# Patient Record
Sex: MALE | Race: WHITE | Employment: FULL TIME | URBAN - NONMETROPOLITAN AREA
[De-identification: names, ages, dates, MRNs, and addresses within clinical notes are randomized per-mention and may not be internally consistent; named-entity substitution may affect disease eponyms.]

---

## 2017-01-30 ENCOUNTER — OFFICE VISIT (OUTPATIENT)
Dept: NEPHROLOGY | Age: 62
End: 2017-01-30

## 2017-01-30 VITALS — DIASTOLIC BLOOD PRESSURE: 84 MMHG | WEIGHT: 169 LBS | SYSTOLIC BLOOD PRESSURE: 136 MMHG | BODY MASS INDEX: 22.92 KG/M2

## 2017-01-30 DIAGNOSIS — N20.0 NEPHROLITHIASIS: Primary | ICD-10-CM

## 2017-01-30 PROCEDURE — G8427 DOCREV CUR MEDS BY ELIG CLIN: HCPCS | Performed by: INTERNAL MEDICINE

## 2017-01-30 PROCEDURE — 3017F COLORECTAL CA SCREEN DOC REV: CPT | Performed by: INTERNAL MEDICINE

## 2017-01-30 PROCEDURE — 1036F TOBACCO NON-USER: CPT | Performed by: INTERNAL MEDICINE

## 2017-01-30 PROCEDURE — 99214 OFFICE O/P EST MOD 30 MIN: CPT | Performed by: INTERNAL MEDICINE

## 2017-01-30 PROCEDURE — G8484 FLU IMMUNIZE NO ADMIN: HCPCS | Performed by: INTERNAL MEDICINE

## 2017-01-30 PROCEDURE — G8420 CALC BMI NORM PARAMETERS: HCPCS | Performed by: INTERNAL MEDICINE

## 2017-08-07 RX ORDER — HYDROCHLOROTHIAZIDE 25 MG/1
25 TABLET ORAL DAILY
Qty: 90 TABLET | Refills: 3 | Status: SHIPPED | OUTPATIENT
Start: 2017-08-07 | End: 2018-07-06 | Stop reason: SDUPTHER

## 2018-01-29 ENCOUNTER — HOSPITAL ENCOUNTER (OUTPATIENT)
Age: 63
Discharge: HOME | End: 2018-01-29
Payer: COMMERCIAL

## 2018-01-29 DIAGNOSIS — N20.0 NEPHROLITHIASIS: ICD-10-CM

## 2018-01-29 LAB
ANION GAP SERPL CALCULATED.3IONS-SCNC: 13 MEQ/L (ref 8–16)
BUN BLDV-MCNC: 16 MG/DL (ref 7–22)
CALCIUM SERPL-MCNC: 9.1 MG/DL (ref 8.5–10.5)
CHLORIDE BLD-SCNC: 100 MEQ/L (ref 98–111)
CO2: 30 MEQ/L (ref 23–33)
CREAT SERPL-MCNC: 1 MG/DL (ref 0.4–1.2)
GFR SERPL CREATININE-BSD FRML MDRD: 76 ML/MIN/1.73M2
GLUCOSE BLD-MCNC: 112 MG/DL (ref 70–108)
POTASSIUM SERPL-SCNC: 3.7 MEQ/L (ref 3.5–5.2)
SODIUM BLD-SCNC: 143 MEQ/L (ref 135–145)

## 2018-01-29 PROCEDURE — 80048 BASIC METABOLIC PNL TOTAL CA: CPT

## 2018-01-29 PROCEDURE — 36415 COLL VENOUS BLD VENIPUNCTURE: CPT

## 2018-02-05 ENCOUNTER — OFFICE VISIT (OUTPATIENT)
Dept: NEPHROLOGY | Age: 63
End: 2018-02-05
Payer: COMMERCIAL

## 2018-02-05 VITALS
BODY MASS INDEX: 25.63 KG/M2 | HEART RATE: 68 BPM | DIASTOLIC BLOOD PRESSURE: 80 MMHG | RESPIRATION RATE: 18 BRPM | WEIGHT: 189 LBS | SYSTOLIC BLOOD PRESSURE: 138 MMHG

## 2018-02-05 DIAGNOSIS — R82.994 HYPERCALCIURIA: ICD-10-CM

## 2018-02-05 DIAGNOSIS — N20.0 NEPHROLITHIASIS: Primary | ICD-10-CM

## 2018-02-05 PROCEDURE — G8419 CALC BMI OUT NRM PARAM NOF/U: HCPCS | Performed by: INTERNAL MEDICINE

## 2018-02-05 PROCEDURE — 99214 OFFICE O/P EST MOD 30 MIN: CPT | Performed by: INTERNAL MEDICINE

## 2018-02-05 PROCEDURE — G8427 DOCREV CUR MEDS BY ELIG CLIN: HCPCS | Performed by: INTERNAL MEDICINE

## 2018-02-05 PROCEDURE — 1036F TOBACCO NON-USER: CPT | Performed by: INTERNAL MEDICINE

## 2018-02-05 PROCEDURE — G8484 FLU IMMUNIZE NO ADMIN: HCPCS | Performed by: INTERNAL MEDICINE

## 2018-02-05 PROCEDURE — 3017F COLORECTAL CA SCREEN DOC REV: CPT | Performed by: INTERNAL MEDICINE

## 2018-02-05 NOTE — PROGRESS NOTES
Renal Progress Note    Assessment and Plan:     1. Nephrolithiasis     2. Hypercalciuria       PLAN  Labs reviewed with the patient and he understood  We went through the lab report together in EPIC. Vitamin D level is stable at 45 and normal  Little link studies revealed average 24 hour urine output of 1.5 L. Everything else remains mostly unchanged. Again I encouraged him to increase his fluid intake significantly to between 2- and 21/2 L. Medications reviewed. No changes. Continue vitamin D3 over-the-counter 1000 units a day. Return visit in 12 months. Patient Active Problem List   Diagnosis    Nephrolithiasis    Urolithiasis    Hypercalciuria       Subjective:   Chief complaint:  Chief Complaint   Patient presents with    1 Year Follow Up     Nephrolithiasis      HPI:This is a follow up visit for Mr. Kenji Pacheco is [facility] who is here today for a return appointment. I see him for nephrolithiasis. He was last seen about 12 months ago. He has been doing well since then. However he does however have some watery eyes and etiology has not been determined but allergies suspected. No new medications since last time I saw him. No kidney stones attack.     ROS:Constitutional: negative  Eyes: negative  Ears, nose, mouth, throat, and face: negative  Respiratory: negative  Cardiovascular: negative  Gastrointestinal: negative  Genitourinary:negative  Integument/breast: negative  Hematologic/lymphatic: negative  Musculoskeletal:positive for arthralgias and stiff joints  Neurological: negative  Behavioral/Psych: negative  Endocrine: negative  Allergic/Immunologic: negative  Medications:     Current Outpatient Prescriptions   Medication Sig Dispense Refill    hydrochlorothiazide (HYDRODIURIL) 25 MG tablet Take 1 tablet by mouth daily 90 tablet 3    potassium citrate (UROCIT-K) 10 MEQ (1080 MG) extended release tablet Take 1 tablet by mouth 3  times daily with meals 300 tablet 5    Vitamin D (CHOLECALCIFEROL) 1000 138/80   Pulse 68   Resp 18   Wt 189 lb (85.7 kg)   BMI 25.63 kg/m²      Constitutional:  Alert, awake, no apparent distress  Skin:normal  HEENT:Pupils are reactive . Throat is clear  Neck:supple with no thyromegally  Cardiovascular:  S1, S2 without m/r/g  Respiratory:  CTA B without w/r/r  Abdomen: +bs, soft, nt  Ext: No LE edema  Musculoskeletal:Intact  Neuro:Alert and oriented with no deficit    Electronically signed by Joan Oliver MD on 2/5/2018 at 8:08 AM

## 2018-03-29 ENCOUNTER — HOSPITAL ENCOUNTER (EMERGENCY)
Age: 63
Discharge: HOME | End: 2018-03-29
Payer: COMMERCIAL

## 2018-03-29 VITALS
HEIGHT: 72 IN | SYSTOLIC BLOOD PRESSURE: 143 MMHG | TEMPERATURE: 98.7 F | DIASTOLIC BLOOD PRESSURE: 87 MMHG | OXYGEN SATURATION: 98 % | WEIGHT: 180 LBS | RESPIRATION RATE: 12 BRPM | HEART RATE: 91 BPM | BODY MASS INDEX: 24.38 KG/M2

## 2018-03-29 DIAGNOSIS — J02.0 STREP PHARYNGITIS: Primary | ICD-10-CM

## 2018-03-29 LAB
GROUP A STREP CULTURE, REFLEX: POSITIVE
REFLEX THROAT C + S: NORMAL

## 2018-03-29 PROCEDURE — 99213 OFFICE O/P EST LOW 20 MIN: CPT | Performed by: NURSE PRACTITIONER

## 2018-03-29 PROCEDURE — 99213 OFFICE O/P EST LOW 20 MIN: CPT

## 2018-03-29 RX ORDER — AMOXICILLIN 500 MG/1
500 CAPSULE ORAL 2 TIMES DAILY
Qty: 20 CAPSULE | Refills: 0 | Status: SHIPPED | OUTPATIENT
Start: 2018-03-29 | End: 2018-04-08

## 2018-03-29 ASSESSMENT — ENCOUNTER SYMPTOMS
SORE THROAT: 1
SINUS PAIN: 0
RHINORRHEA: 0
NAUSEA: 0
VOMITING: 0
COUGH: 0
DIARRHEA: 0
SHORTNESS OF BREATH: 0
SINUS PRESSURE: 0

## 2018-03-29 ASSESSMENT — PAIN DESCRIPTION - LOCATION: LOCATION: THROAT

## 2018-03-29 ASSESSMENT — PAIN DESCRIPTION - PAIN TYPE: TYPE: ACUTE PAIN

## 2018-03-29 ASSESSMENT — PAIN SCALES - GENERAL: PAINLEVEL_OUTOF10: 5

## 2018-03-29 ASSESSMENT — PAIN DESCRIPTION - DESCRIPTORS: DESCRIPTORS: ACHING

## 2018-03-29 ASSESSMENT — PAIN DESCRIPTION - ONSET: ONSET: GRADUAL

## 2018-03-29 ASSESSMENT — PAIN DESCRIPTION - FREQUENCY: FREQUENCY: CONTINUOUS

## 2018-03-29 ASSESSMENT — PAIN DESCRIPTION - PROGRESSION: CLINICAL_PROGRESSION: NOT CHANGED

## 2018-03-29 NOTE — ED PROVIDER NOTES
[address]  Urgent Care Encounter       CHIEF COMPLAINT       Chief Complaint   Patient presents with    Pharyngitis    Muscle Pain       Nurses Notes reviewed and I agree except as noted in the HPI. HISTORY OF PRESENT ILLNESS   Letitia Najjar is a 66 y.o. male who presents With three-day history of sore throat and body aches. Also reports occasional chills but no fever. He does have some tenderness in his neck. Appetite is been decreased but has been drinking. It's able to swallow liquids easily but solid food creates a fair amount of pain. He denies any ear pain and cough. He has mild sinus congestion. No nausea, vomiting, or diarrhea. He did mention that he was around his daughter approximately 2 weeks ago who he found out later was diagnosed with strep throat. He is not sure if that timeframe coordinates with his symptoms. The history is provided by the patient. No  was used. REVIEW OF SYSTEMS     Review of Systems   Constitutional: Positive for appetite change and chills (Occasional). Negative for fatigue and fever. HENT: Positive for congestion (Mild) and sore throat. Negative for rhinorrhea, sinus pain and sinus pressure. Respiratory: Negative for cough and shortness of breath. Cardiovascular: Negative for chest pain. Gastrointestinal: Negative for diarrhea, nausea and vomiting. Musculoskeletal: Positive for myalgias. Neurological: Negative for dizziness and headaches. PAST MEDICAL HISTORY         Diagnosis Date    Kidney stones        SURGICAL HISTORY     Patient  has a past surgical history that includes hernia repair (1967?); Cystocopy (12/11/2012); and knee surgery (Right, March 2013).     CURRENT MEDICATIONS       Discharge Medication List as of 3/29/2018  2:56 PM      CONTINUE these medications which have NOT CHANGED    Details   hydrochlorothiazide (HYDRODIURIL) 25 MG tablet Take 1 tablet by mouth daily, Disp-90 tablet, Head (left side): No submental, no submandibular, no tonsillar, no preauricular, no posterior auricular and no occipital adenopathy present. He has cervical adenopathy. Right cervical: Posterior cervical adenopathy present. Left cervical: Posterior cervical adenopathy present. Neurological: He is alert and oriented to person, place, and time. Skin: Skin is warm and dry. Psychiatric: He has a normal mood and affect. His speech is normal and behavior is normal. Judgment and thought content normal.   Nursing note and vitals reviewed. DIAGNOSTIC RESULTS   Labs:  Results for orders placed or performed during the hospital encounter of 03/29/18   STREP A ANTIGEN   Result Value Ref Range    GROUP A STREP CULTURE, REFLEX POSITIVE (A)        IMAGING:    No orders to display           URGENT CARE COURSE:     Vitals:    03/29/18 1416   BP: (!) 143/87   Pulse: 91   Resp: 12   Temp: 98.7 °F (37.1 °C)   TempSrc: Oral   SpO2: 98%   Weight: 180 lb (81.6 kg)   Height: 6' (1.829 m)       Medications - No data to display    ED Course        PROCEDURES:  None    FINAL IMPRESSION      1. Strep pharyngitis          DISPOSITION/PLAN   DISPOSITION Decision To Discharge 03/29/2018 02:54:13 PM  Plenty of fluids orally. Salt water gargles as needed for sore throat or OTC throat spray/lozenges. Cool mist humidifier at bedside for congestion. Saline rinses as needed for nasal congestion. May take Tylenol and/or Ibuprofen for fever or body aches as directed. May take OTC antihistamines/ decongestant as needed. Follow up with family doctor as needed. Pt to go to ER if symptoms worsen, new symptoms develop, high fever >102, vomiting, breathing difficulty, lethargy. Take antibiotic as prescribed for the full 10 days. Patient's positive for strep pharyngitis. Treated with amoxicillin 500 mg twice a day ×10 days. Other instructions as indicated above. All the patient's questions answered.  The patient was discharged from the  center in good condition.     PATIENT REFERRED TO:  Adry Aj MD  [address] [place] [address]  [phone]    In 1 week  As needed, If symptoms worsen      DISCHARGE MEDICATIONS:  Discharge Medication List as of 3/29/2018  2:56 PM      START taking these medications    Details   amoxicillin (AMOXIL) 500 MG capsule Take 1 capsule by mouth 2 times daily for 10 days, Disp-20 capsule, R-0Normal             Discharge Medication List as of 3/29/2018  2:56 PM          Discharge Medication List as of 3/29/2018  2:56 PM          Dian Gu CNP    (Please note that portions of this note were completed with a voice recognition program.  Efforts were made to edit the dictations but occasionally words are mis-transcribed.)         Dian Gu CNP  03/29/18 1500

## 2018-07-06 DIAGNOSIS — R82.994 HYPERCALCIURIA: ICD-10-CM

## 2018-07-06 DIAGNOSIS — N20.0 NEPHROLITHIASIS: ICD-10-CM

## 2018-07-06 RX ORDER — POTASSIUM CITRATE 10 MEQ/1
TABLET, EXTENDED RELEASE ORAL
Qty: 270 TABLET | Refills: 3 | Status: SHIPPED | OUTPATIENT
Start: 2018-07-06 | End: 2018-12-18 | Stop reason: SDUPTHER

## 2018-07-06 RX ORDER — HYDROCHLOROTHIAZIDE 25 MG/1
25 TABLET ORAL DAILY
Qty: 90 TABLET | Refills: 3 | Status: SHIPPED | OUTPATIENT
Start: 2018-07-06 | End: 2019-08-14 | Stop reason: SDUPTHER

## 2018-12-18 DIAGNOSIS — R82.994 HYPERCALCIURIA: ICD-10-CM

## 2018-12-18 DIAGNOSIS — N20.0 NEPHROLITHIASIS: ICD-10-CM

## 2018-12-19 RX ORDER — POTASSIUM CITRATE 10 MEQ/1
TABLET, EXTENDED RELEASE ORAL
Qty: 270 TABLET | Refills: 2 | Status: SHIPPED | OUTPATIENT
Start: 2018-12-19 | End: 2019-08-17 | Stop reason: SDUPTHER

## 2019-01-29 DIAGNOSIS — N20.0 NEPHROLITHIASIS: Primary | ICD-10-CM

## 2019-02-03 ENCOUNTER — HOSPITAL ENCOUNTER (OUTPATIENT)
Age: 64
Discharge: HOME | End: 2019-02-03
Payer: COMMERCIAL

## 2019-02-03 DIAGNOSIS — N20.0 NEPHROLITHIASIS: ICD-10-CM

## 2019-02-03 LAB
ANION GAP SERPL CALCULATED.3IONS-SCNC: 10 MEQ/L (ref 8–16)
BUN BLDV-MCNC: 14 MG/DL (ref 7–22)
CALCIUM SERPL-MCNC: 9.2 MG/DL (ref 8.5–10.5)
CHLORIDE BLD-SCNC: 99 MEQ/L (ref 98–111)
CO2: 30 MEQ/L (ref 23–33)
CREAT SERPL-MCNC: 1 MG/DL (ref 0.4–1.2)
GFR SERPL CREATININE-BSD FRML MDRD: 76 ML/MIN/1.73M2
GLUCOSE BLD-MCNC: 99 MG/DL (ref 70–108)
POTASSIUM SERPL-SCNC: 4.2 MEQ/L (ref 3.5–5.2)
SODIUM BLD-SCNC: 139 MEQ/L (ref 135–145)

## 2019-02-03 PROCEDURE — 80048 BASIC METABOLIC PNL TOTAL CA: CPT

## 2019-02-03 PROCEDURE — 36415 COLL VENOUS BLD VENIPUNCTURE: CPT

## 2019-02-04 ENCOUNTER — OFFICE VISIT (OUTPATIENT)
Dept: NEPHROLOGY | Age: 64
End: 2019-02-04
Payer: COMMERCIAL

## 2019-02-04 VITALS
DIASTOLIC BLOOD PRESSURE: 80 MMHG | WEIGHT: 193.8 LBS | BODY MASS INDEX: 26.28 KG/M2 | SYSTOLIC BLOOD PRESSURE: 131 MMHG | OXYGEN SATURATION: 98 % | HEART RATE: 71 BPM

## 2019-02-04 DIAGNOSIS — R82.994 HYPERCALCIURIA: ICD-10-CM

## 2019-02-04 DIAGNOSIS — N20.0 NEPHROLITHIASIS: Primary | ICD-10-CM

## 2019-02-04 PROCEDURE — 3017F COLORECTAL CA SCREEN DOC REV: CPT | Performed by: INTERNAL MEDICINE

## 2019-02-04 PROCEDURE — 1036F TOBACCO NON-USER: CPT | Performed by: INTERNAL MEDICINE

## 2019-02-04 PROCEDURE — 99213 OFFICE O/P EST LOW 20 MIN: CPT | Performed by: INTERNAL MEDICINE

## 2019-02-04 PROCEDURE — G8419 CALC BMI OUT NRM PARAM NOF/U: HCPCS | Performed by: INTERNAL MEDICINE

## 2019-02-04 PROCEDURE — G8427 DOCREV CUR MEDS BY ELIG CLIN: HCPCS | Performed by: INTERNAL MEDICINE

## 2019-02-04 PROCEDURE — G8484 FLU IMMUNIZE NO ADMIN: HCPCS | Performed by: INTERNAL MEDICINE

## 2019-02-04 RX ORDER — FLUTICASONE PROPIONATE 50 MCG
SPRAY, SUSPENSION (ML) NASAL
COMMUNITY
Start: 2019-01-29 | End: 2019-07-15

## 2019-02-04 RX ORDER — TRIAMCINOLONE ACETONIDE 5 MG/G
CREAM TOPICAL
COMMUNITY
Start: 2019-01-29 | End: 2019-07-15

## 2019-07-15 ENCOUNTER — OFFICE VISIT (OUTPATIENT)
Dept: FAMILY MEDICINE CLINIC | Age: 64
End: 2019-07-15
Payer: COMMERCIAL

## 2019-07-15 VITALS
DIASTOLIC BLOOD PRESSURE: 78 MMHG | BODY MASS INDEX: 25.33 KG/M2 | WEIGHT: 187 LBS | SYSTOLIC BLOOD PRESSURE: 130 MMHG | HEART RATE: 64 BPM | HEIGHT: 72 IN

## 2019-07-15 DIAGNOSIS — R10.32 LEFT GROIN PAIN: Primary | ICD-10-CM

## 2019-07-15 DIAGNOSIS — S76.212A GROIN STRAIN, LEFT, INITIAL ENCOUNTER: ICD-10-CM

## 2019-07-15 PROCEDURE — G8427 DOCREV CUR MEDS BY ELIG CLIN: HCPCS | Performed by: FAMILY MEDICINE

## 2019-07-15 PROCEDURE — 3017F COLORECTAL CA SCREEN DOC REV: CPT | Performed by: FAMILY MEDICINE

## 2019-07-15 PROCEDURE — 99213 OFFICE O/P EST LOW 20 MIN: CPT | Performed by: FAMILY MEDICINE

## 2019-07-15 PROCEDURE — G8419 CALC BMI OUT NRM PARAM NOF/U: HCPCS | Performed by: FAMILY MEDICINE

## 2019-07-15 PROCEDURE — 1036F TOBACCO NON-USER: CPT | Performed by: FAMILY MEDICINE

## 2019-07-15 ASSESSMENT — PATIENT HEALTH QUESTIONNAIRE - PHQ9
SUM OF ALL RESPONSES TO PHQ9 QUESTIONS 1 & 2: 0
2. FEELING DOWN, DEPRESSED OR HOPELESS: 0
SUM OF ALL RESPONSES TO PHQ QUESTIONS 1-9: 0
1. LITTLE INTEREST OR PLEASURE IN DOING THINGS: 0
SUM OF ALL RESPONSES TO PHQ QUESTIONS 1-9: 0

## 2019-07-15 ASSESSMENT — ENCOUNTER SYMPTOMS: ABDOMINAL PAIN: 0

## 2019-08-17 DIAGNOSIS — N20.0 NEPHROLITHIASIS: ICD-10-CM

## 2019-08-17 DIAGNOSIS — R82.994 HYPERCALCIURIA: ICD-10-CM

## 2019-08-19 RX ORDER — POTASSIUM CITRATE 10 MEQ/1
TABLET, EXTENDED RELEASE ORAL
Qty: 270 TABLET | Refills: 1 | Status: SHIPPED | OUTPATIENT
Start: 2019-08-19 | End: 2020-01-09

## 2019-08-21 RX ORDER — HYDROCHLOROTHIAZIDE 25 MG/1
25 TABLET ORAL DAILY
Qty: 90 TABLET | Refills: 3 | Status: SHIPPED | OUTPATIENT
Start: 2019-08-21 | End: 2020-06-29

## 2019-08-22 ENCOUNTER — HOSPITAL ENCOUNTER (EMERGENCY)
Age: 64
Discharge: HOME | End: 2019-08-22
Attending: NURSE PRACTITIONER
Payer: COMMERCIAL

## 2019-08-22 VITALS
OXYGEN SATURATION: 98 % | TEMPERATURE: 98 F | RESPIRATION RATE: 18 BRPM | HEART RATE: 73 BPM | HEIGHT: 72 IN | DIASTOLIC BLOOD PRESSURE: 86 MMHG | WEIGHT: 190 LBS | BODY MASS INDEX: 25.73 KG/M2 | SYSTOLIC BLOOD PRESSURE: 139 MMHG

## 2019-08-22 DIAGNOSIS — J02.9 ACUTE PHARYNGITIS, UNSPECIFIED ETIOLOGY: Primary | ICD-10-CM

## 2019-08-22 PROCEDURE — 99213 OFFICE O/P EST LOW 20 MIN: CPT | Performed by: NURSE PRACTITIONER

## 2019-08-22 PROCEDURE — 99212 OFFICE O/P EST SF 10 MIN: CPT

## 2019-08-22 RX ORDER — AMOXICILLIN 875 MG/1
875 TABLET, COATED ORAL 2 TIMES DAILY
Qty: 20 TABLET | Refills: 0 | Status: SHIPPED | OUTPATIENT
Start: 2019-08-22 | End: 2019-09-01

## 2019-08-22 ASSESSMENT — ENCOUNTER SYMPTOMS
SINUS PAIN: 0
SINUS PRESSURE: 0
TROUBLE SWALLOWING: 1
NAUSEA: 0
VOMITING: 0
SORE THROAT: 1
DIARRHEA: 0
SHORTNESS OF BREATH: 0
ABDOMINAL PAIN: 0

## 2019-08-22 ASSESSMENT — PAIN DESCRIPTION - PAIN TYPE: TYPE: ACUTE PAIN

## 2019-08-22 ASSESSMENT — PAIN DESCRIPTION - ONSET: ONSET: ON-GOING

## 2019-08-22 ASSESSMENT — PAIN - FUNCTIONAL ASSESSMENT: PAIN_FUNCTIONAL_ASSESSMENT: ACTIVITIES ARE NOT PREVENTED

## 2019-08-22 ASSESSMENT — PAIN DESCRIPTION - FREQUENCY: FREQUENCY: CONTINUOUS

## 2019-08-22 ASSESSMENT — PAIN DESCRIPTION - LOCATION: LOCATION: THROAT

## 2019-08-22 ASSESSMENT — PAIN DESCRIPTION - PROGRESSION: CLINICAL_PROGRESSION: NOT CHANGED

## 2019-08-22 ASSESSMENT — PAIN SCALES - GENERAL: PAINLEVEL_OUTOF10: 3

## 2019-08-22 ASSESSMENT — PAIN DESCRIPTION - DESCRIPTORS: DESCRIPTORS: DISCOMFORT

## 2019-09-14 ENCOUNTER — HOSPITAL ENCOUNTER (EMERGENCY)
Age: 64
Discharge: HOME | End: 2019-09-14
Payer: COMMERCIAL

## 2019-09-14 VITALS
HEIGHT: 72 IN | HEART RATE: 66 BPM | WEIGHT: 185 LBS | DIASTOLIC BLOOD PRESSURE: 74 MMHG | OXYGEN SATURATION: 96 % | TEMPERATURE: 98.1 F | RESPIRATION RATE: 16 BRPM | BODY MASS INDEX: 25.06 KG/M2 | SYSTOLIC BLOOD PRESSURE: 111 MMHG

## 2019-09-14 DIAGNOSIS — J40 BRONCHITIS: Primary | ICD-10-CM

## 2019-09-14 DIAGNOSIS — J02.9 ACUTE PHARYNGITIS, UNSPECIFIED ETIOLOGY: ICD-10-CM

## 2019-09-14 PROCEDURE — 99212 OFFICE O/P EST SF 10 MIN: CPT

## 2019-09-14 PROCEDURE — 99214 OFFICE O/P EST MOD 30 MIN: CPT | Performed by: NURSE PRACTITIONER

## 2019-09-14 RX ORDER — PREDNISONE 20 MG/1
40 TABLET ORAL DAILY
Qty: 10 TABLET | Refills: 0 | Status: SHIPPED | OUTPATIENT
Start: 2019-09-14 | End: 2019-09-19

## 2019-09-14 RX ORDER — AMOXICILLIN AND CLAVULANATE POTASSIUM 875; 125 MG/1; MG/1
1 TABLET, FILM COATED ORAL 2 TIMES DAILY
Qty: 20 TABLET | Refills: 0 | Status: SHIPPED | OUTPATIENT
Start: 2019-09-14 | End: 2019-09-24

## 2019-09-14 ASSESSMENT — PAIN DESCRIPTION - PROGRESSION: CLINICAL_PROGRESSION: NOT CHANGED

## 2019-09-14 ASSESSMENT — PAIN DESCRIPTION - LOCATION: LOCATION: CHEST

## 2019-09-14 ASSESSMENT — PAIN DESCRIPTION - ONSET: ONSET: ON-GOING

## 2019-09-14 ASSESSMENT — PAIN DESCRIPTION - PAIN TYPE: TYPE: ACUTE PAIN

## 2019-09-14 ASSESSMENT — PAIN DESCRIPTION - DESCRIPTORS: DESCRIPTORS: PRESSURE;TIGHTNESS

## 2019-09-14 ASSESSMENT — PAIN SCALES - GENERAL: PAINLEVEL_OUTOF10: 5

## 2019-09-14 ASSESSMENT — PAIN DESCRIPTION - ORIENTATION: ORIENTATION: MID

## 2019-09-14 ASSESSMENT — PAIN DESCRIPTION - FREQUENCY: FREQUENCY: CONTINUOUS

## 2019-09-14 ASSESSMENT — PAIN - FUNCTIONAL ASSESSMENT: PAIN_FUNCTIONAL_ASSESSMENT: ACTIVITIES ARE NOT PREVENTED

## 2019-09-14 NOTE — ED PROVIDER NOTES
[address]  Urgent Care Encounter       CHIEF COMPLAINT       Chief Complaint   Patient presents with    Cough    Pharyngitis       Nurses Notes reviewed and I agree except as noted in the HPI. HISTORY OF PRESENT ILLNESS   Shmuel Alcocer is a 60 y.o. male who presents with complaints of sore throat, chest tightness and cough for the past 10 days. Symptoms have become progressively worse since onset. Patient denies fever, chills, nausea, vomiting, otalgia, shortness of breath and fatigue. No reports of wheezing. He did have a similar illness approximately 1/2 months ago which was treated successfully with antibiotics. The history is provided by the patient. REVIEW OF SYSTEMS     Review of Systems   Constitutional: Negative for appetite change, chills, fatigue and fever. HENT: Positive for congestion, rhinorrhea and sore throat. Negative for sinus pressure. Eyes: Negative for visual disturbance. Respiratory: Positive for cough. Negative for shortness of breath and wheezing. Cardiovascular: Negative for chest pain. Gastrointestinal: Negative for diarrhea, nausea and vomiting. Skin: Negative for rash. Allergic/Immunologic: Negative for environmental allergies. Neurological: Negative for headaches. PAST MEDICAL HISTORY         Diagnosis Date    Kidney stones        SURGICALHISTORY     Patient  has a past surgical history that includes hernia repair (1967?); Cystocopy (12/11/2012); knee surgery (Right, March 2013); and knee surgery (Left).     CURRENT MEDICATIONS       Discharge Medication List as of 9/14/2019 10:44 AM      CONTINUE these medications which have NOT CHANGED    Details   hydrochlorothiazide (HYDRODIURIL) 25 MG tablet TAKE 1 TABLET BY MOUTH  DAILY, Disp-90 tablet, R-3Normal      potassium citrate (UROCIT-K) 10 MEQ (1080 MG) extended release tablet TAKE 1 TABLET BY MOUTH 3  TIMES DAILY WITH MEALS, Disp-270 tablet, R-1Normal      Vitamin D

## 2019-09-16 ASSESSMENT — ENCOUNTER SYMPTOMS
COUGH: 1
SORE THROAT: 1
SINUS PRESSURE: 0
VOMITING: 0
DIARRHEA: 0
SHORTNESS OF BREATH: 0
RHINORRHEA: 1
NAUSEA: 0
WHEEZING: 0

## 2019-12-13 ENCOUNTER — OFFICE VISIT (OUTPATIENT)
Dept: FAMILY MEDICINE CLINIC | Age: 64
End: 2019-12-13
Payer: COMMERCIAL

## 2019-12-13 VITALS
SYSTOLIC BLOOD PRESSURE: 124 MMHG | DIASTOLIC BLOOD PRESSURE: 80 MMHG | HEIGHT: 72 IN | BODY MASS INDEX: 25.47 KG/M2 | WEIGHT: 188 LBS | RESPIRATION RATE: 14 BRPM | HEART RATE: 76 BPM

## 2019-12-13 DIAGNOSIS — R10.32 LEFT GROIN PAIN: Primary | ICD-10-CM

## 2019-12-13 PROCEDURE — 99213 OFFICE O/P EST LOW 20 MIN: CPT | Performed by: FAMILY MEDICINE

## 2019-12-13 PROCEDURE — 1036F TOBACCO NON-USER: CPT | Performed by: FAMILY MEDICINE

## 2019-12-13 PROCEDURE — G8427 DOCREV CUR MEDS BY ELIG CLIN: HCPCS | Performed by: FAMILY MEDICINE

## 2019-12-13 PROCEDURE — 3017F COLORECTAL CA SCREEN DOC REV: CPT | Performed by: FAMILY MEDICINE

## 2019-12-13 PROCEDURE — G8419 CALC BMI OUT NRM PARAM NOF/U: HCPCS | Performed by: FAMILY MEDICINE

## 2019-12-13 PROCEDURE — G8484 FLU IMMUNIZE NO ADMIN: HCPCS | Performed by: FAMILY MEDICINE

## 2020-01-02 ENCOUNTER — HOSPITAL ENCOUNTER (OUTPATIENT)
Dept: GENERAL RADIOLOGY | Age: 65
Discharge: HOME | End: 2020-01-02
Payer: COMMERCIAL

## 2020-01-02 ENCOUNTER — HOSPITAL ENCOUNTER (OUTPATIENT)
Age: 65
Discharge: HOME | End: 2020-01-02
Payer: COMMERCIAL

## 2020-01-02 PROCEDURE — 73502 X-RAY EXAM HIP UNI 2-3 VIEWS: CPT

## 2020-01-03 ENCOUNTER — TELEPHONE (OUTPATIENT)
Dept: FAMILY MEDICINE CLINIC | Age: 65
End: 2020-01-03

## 2020-01-03 PROBLEM — M16.12 ARTHRITIS OF LEFT HIP: Status: ACTIVE | Noted: 2020-01-03

## 2020-01-09 RX ORDER — POTASSIUM CITRATE 10 MEQ/1
TABLET, EXTENDED RELEASE ORAL
Qty: 270 TABLET | Refills: 1 | Status: SHIPPED | OUTPATIENT
Start: 2020-01-09 | End: 2020-03-19

## 2020-01-27 ENCOUNTER — HOSPITAL ENCOUNTER (OUTPATIENT)
Age: 65
Discharge: HOME | End: 2020-01-27
Payer: COMMERCIAL

## 2020-01-27 DIAGNOSIS — N20.0 NEPHROLITHIASIS: ICD-10-CM

## 2020-01-27 LAB
ANION GAP SERPL CALCULATED.3IONS-SCNC: 12 MEQ/L (ref 8–16)
BUN BLDV-MCNC: 19 MG/DL (ref 7–22)
CALCIUM SERPL-MCNC: 9.3 MG/DL (ref 8.5–10.5)
CHLORIDE BLD-SCNC: 99 MEQ/L (ref 98–111)
CO2: 30 MEQ/L (ref 23–33)
CREAT SERPL-MCNC: 1 MG/DL (ref 0.4–1.2)
GFR SERPL CREATININE-BSD FRML MDRD: 75 ML/MIN/1.73M2
GLUCOSE BLD-MCNC: 90 MG/DL (ref 70–108)
POTASSIUM SERPL-SCNC: 3.5 MEQ/L (ref 3.5–5.2)
SODIUM BLD-SCNC: 141 MEQ/L (ref 135–145)

## 2020-01-27 PROCEDURE — 36415 COLL VENOUS BLD VENIPUNCTURE: CPT

## 2020-01-27 PROCEDURE — 80048 BASIC METABOLIC PNL TOTAL CA: CPT

## 2020-02-03 ENCOUNTER — OFFICE VISIT (OUTPATIENT)
Dept: NEPHROLOGY | Age: 65
End: 2020-02-03
Payer: COMMERCIAL

## 2020-02-03 VITALS
OXYGEN SATURATION: 97 % | DIASTOLIC BLOOD PRESSURE: 78 MMHG | WEIGHT: 194 LBS | SYSTOLIC BLOOD PRESSURE: 128 MMHG | HEART RATE: 59 BPM | BODY MASS INDEX: 26.31 KG/M2

## 2020-02-03 PROCEDURE — 3017F COLORECTAL CA SCREEN DOC REV: CPT | Performed by: INTERNAL MEDICINE

## 2020-02-03 PROCEDURE — G8427 DOCREV CUR MEDS BY ELIG CLIN: HCPCS | Performed by: INTERNAL MEDICINE

## 2020-02-03 PROCEDURE — 1036F TOBACCO NON-USER: CPT | Performed by: INTERNAL MEDICINE

## 2020-02-03 PROCEDURE — G8419 CALC BMI OUT NRM PARAM NOF/U: HCPCS | Performed by: INTERNAL MEDICINE

## 2020-02-03 PROCEDURE — G8484 FLU IMMUNIZE NO ADMIN: HCPCS | Performed by: INTERNAL MEDICINE

## 2020-02-03 PROCEDURE — 99213 OFFICE O/P EST LOW 20 MIN: CPT | Performed by: INTERNAL MEDICINE

## 2020-02-03 NOTE — PROGRESS NOTES
with no wheezes or rales  Abdomen: +bowel sound, soft, non tender and no bruit  Ext: No LE edema  Musculoskeletal:Intact  Neuro:Alert, awake and oriented with no obvious focal deficit.   Speech is normal.    Electronically signed by Silvia Roberson MD on 2/3/2020 at 8:00 AM

## 2020-03-19 RX ORDER — POTASSIUM CITRATE 10 MEQ/1
TABLET, EXTENDED RELEASE ORAL
Qty: 270 TABLET | Refills: 3 | Status: SHIPPED | OUTPATIENT
Start: 2020-03-19 | End: 2021-01-27

## 2020-06-29 RX ORDER — HYDROCHLOROTHIAZIDE 25 MG/1
25 TABLET ORAL DAILY
Qty: 90 TABLET | Refills: 3 | Status: SHIPPED | OUTPATIENT
Start: 2020-06-29 | End: 2021-05-21

## 2021-01-26 DIAGNOSIS — N20.0 NEPHROLITHIASIS: ICD-10-CM

## 2021-01-26 DIAGNOSIS — R82.994 HYPERCALCIURIA: ICD-10-CM

## 2021-01-27 RX ORDER — POTASSIUM CITRATE 10 MEQ/1
TABLET, EXTENDED RELEASE ORAL
Qty: 270 TABLET | Refills: 3 | Status: SHIPPED | OUTPATIENT
Start: 2021-01-27 | End: 2021-06-25

## 2021-02-10 DIAGNOSIS — N20.0 NEPHROLITHIASIS: Primary | ICD-10-CM

## 2021-02-11 ENCOUNTER — HOSPITAL ENCOUNTER (OUTPATIENT)
Age: 66
Discharge: HOME | End: 2021-02-11
Payer: COMMERCIAL

## 2021-02-11 DIAGNOSIS — N20.0 NEPHROLITHIASIS: ICD-10-CM

## 2021-02-11 PROCEDURE — 80048 BASIC METABOLIC PNL TOTAL CA: CPT

## 2021-02-11 PROCEDURE — 36415 COLL VENOUS BLD VENIPUNCTURE: CPT

## 2021-02-12 LAB
ANION GAP SERPL CALCULATED.3IONS-SCNC: 8 MEQ/L (ref 8–16)
BUN BLDV-MCNC: 14 MG/DL (ref 7–22)
CALCIUM SERPL-MCNC: 9.3 MG/DL (ref 8.5–10.5)
CHLORIDE BLD-SCNC: 99 MEQ/L (ref 98–111)
CO2: 32 MEQ/L (ref 23–33)
CREAT SERPL-MCNC: 0.9 MG/DL (ref 0.4–1.2)
GFR SERPL CREATININE-BSD FRML MDRD: 85 ML/MIN/1.73M2
GLUCOSE BLD-MCNC: 85 MG/DL (ref 70–108)
POTASSIUM SERPL-SCNC: 4 MEQ/L (ref 3.5–5.2)
SODIUM BLD-SCNC: 139 MEQ/L (ref 135–145)

## 2021-02-15 ENCOUNTER — OFFICE VISIT (OUTPATIENT)
Dept: NEPHROLOGY | Age: 66
End: 2021-02-15
Payer: COMMERCIAL

## 2021-02-15 VITALS
DIASTOLIC BLOOD PRESSURE: 79 MMHG | HEART RATE: 71 BPM | SYSTOLIC BLOOD PRESSURE: 128 MMHG | WEIGHT: 196 LBS | BODY MASS INDEX: 26.58 KG/M2 | OXYGEN SATURATION: 98 % | TEMPERATURE: 98.6 F

## 2021-02-15 DIAGNOSIS — R82.994 HYPERCALCIURIA: ICD-10-CM

## 2021-02-15 DIAGNOSIS — N20.0 NEPHROLITHIASIS: Primary | ICD-10-CM

## 2021-02-15 PROCEDURE — 1036F TOBACCO NON-USER: CPT | Performed by: INTERNAL MEDICINE

## 2021-02-15 PROCEDURE — 3017F COLORECTAL CA SCREEN DOC REV: CPT | Performed by: INTERNAL MEDICINE

## 2021-02-15 PROCEDURE — G8427 DOCREV CUR MEDS BY ELIG CLIN: HCPCS | Performed by: INTERNAL MEDICINE

## 2021-02-15 PROCEDURE — G8484 FLU IMMUNIZE NO ADMIN: HCPCS | Performed by: INTERNAL MEDICINE

## 2021-02-15 PROCEDURE — G8419 CALC BMI OUT NRM PARAM NOF/U: HCPCS | Performed by: INTERNAL MEDICINE

## 2021-02-15 PROCEDURE — 99212 OFFICE O/P EST SF 10 MIN: CPT | Performed by: INTERNAL MEDICINE

## 2021-02-15 NOTE — PROGRESS NOTES
Renal Progress Note    Assessment and Plan:      Diagnosis Orders   1. Nephrolithiasis  Basic Metabolic Panel    LITHOLINK   2. Hypercalciuria       PLAN:   Lab result discussed with the patient. He understood. Kidney function is normal.  Litholink report discussed with him. The parameters are all favorable. Low risk for kidney stone attack based on the report. Discussed with the patient  Return visit in 12 months with BMP and Litholink studies      Patient Active Problem List   Diagnosis    Nephrolithiasis    Urolithiasis    Hypercalciuria    Arthritis of left hip       Subjective:   Chief complaint:  Chief Complaint   Patient presents with    Nephrolithiasis      HPI:This is a follow up visit for Mr. Liz Menendez  who is here today for return appointment. I see him for nephrolithiasis and hypercalciuria. He was last seen about 12 months ago. Doing well since then with no complaint. No new medications since last seen. No chest pain or shortness of breath. No nausea vomiting. No fever chills. No headaches. No kidney stone attack. ROS:  Pertinent positives stated above in HPI. All other systems were reviewed and were negative. Medications:     Current Outpatient Medications   Medication Sig Dispense Refill    potassium citrate (UROCIT-K) 10 MEQ (1080 MG) extended release tablet TAKE 1 TABLET BY MOUTH 3  TIMES DAILY WITH MEALS 270 tablet 3    hydroCHLOROthiazide (HYDRODIURIL) 25 MG tablet TAKE 1 TABLET BY MOUTH  DAILY 90 tablet 3    Vitamin D (CHOLECALCIFEROL) 1000 UNITS CAPS capsule Take 1,000 Units by mouth daily        No current facility-administered medications for this visit.         Lab Results:    CBC:   Lab Results   Component Value Date    WBC 5.8 04/08/2015    HGB 14.8 04/08/2015    HCT 43.4 04/08/2015    MCV 91.5 04/08/2015     04/08/2015     BMP:    Lab Results   Component Value Date     02/11/2021     01/27/2020     02/03/2019    K 4.0 02/11/2021 K 3.5 01/27/2020    K 4.2 02/03/2019    CL 99 02/11/2021    CL 99 01/27/2020    CL 99 02/03/2019    CO2 32 02/11/2021    CO2 30 01/27/2020    CO2 30 02/03/2019    BUN 14 02/11/2021    BUN 19 01/27/2020    BUN 14 02/03/2019    CREATININE 0.9 02/11/2021    CREATININE 1.0 01/27/2020    CREATININE 1.0 02/03/2019    GLUCOSE 85 02/11/2021    GLUCOSE 90 01/27/2020    GLUCOSE 99 02/03/2019      Hepatic:   Lab Results   Component Value Date    AST 28 09/11/2012    ALT 25 09/11/2012    BILITOT 2.1 (H) 09/11/2012    ALKPHOS 50 09/11/2012     BNP: No results found for: BNP  Lipids:   Lab Results   Component Value Date    CHOL 200 (H) 03/15/2014    HDL 58 03/15/2014     INR: No results found for: INR  URINE: No results found for: NAUR, PROTUR  Lab Results   Component Value Date    NITRU Negative 04/10/2013    COLORU [place] 04/10/2013    PHUR 5.0 04/10/2013    WBCUA 0-2 09/11/2012    RBCUA 25-50 09/11/2012    MUCUS NONE SEEN 09/11/2012    YEAST NONE SEEN 09/11/2012    BACTERIA NONE 09/11/2012    CLARITYU Clear 04/10/2013    LEUKOCYTESUR neg 04/10/2013    LEUKOCYTESUR NEGATIVE 09/11/2012    UROBILINOGEN 0.2 09/11/2012    BILIRUBINUR NEGATIVE 09/11/2012    BLOODU Negative 04/10/2013    GLUCOSEU neg 04/10/2013    KETUA Negative 04/10/2013    AMORPHOUS NONE SEEN 09/11/2012      Microalbumen/Creatinine ratio:  No components found for: RUCREAT    Objective:   Vitals: /79 (Site: Right Upper Arm, Position: Sitting, Cuff Size: Large Adult)   Pulse 71   Temp 98.6 °F (37 °C)   Wt 196 lb (88.9 kg)   SpO2 98%   BMI 26.58 kg/m²      Constitutional:  Alert, awake, no apparent distress  Skin:normal with no rash or any lesions  HEENT:Pupils are reactive . Throat is clear.   Oral mucosa is moist.  Neck:supple with no thyromegaly or bruit   Cardiovascular:  S1, S2 without murmur   Respiratory:  Clear to auscultation with no wheezes or rales  Abdomen: +bowel sound, soft, non tender and no bruit  Ext: No LE edema  Musculoskeletal:Intact Neuro:Alert, awake and oriented with no obvious focal deficit. Speech is normal.    Electronically signed by Marleni Cardenas MD on 2/15/2021 at 8:16 AM   **This report has been created using voice recognition software. It maycontain minor  errors which are inherent in voice recognition technology. **

## 2021-04-01 ENCOUNTER — HOSPITAL ENCOUNTER (OUTPATIENT)
Dept: GENERAL RADIOLOGY | Age: 66
Discharge: HOME | End: 2021-04-01
Payer: COMMERCIAL

## 2021-04-01 ENCOUNTER — HOSPITAL ENCOUNTER (OUTPATIENT)
Age: 66
Discharge: HOME | End: 2021-04-01
Payer: COMMERCIAL

## 2021-04-01 DIAGNOSIS — M54.12 CERVICAL RADICULITIS: ICD-10-CM

## 2021-04-01 PROCEDURE — 72040 X-RAY EXAM NECK SPINE 2-3 VW: CPT

## 2021-04-08 ENCOUNTER — HOSPITAL ENCOUNTER (OUTPATIENT)
Dept: MRI IMAGING | Age: 66
Discharge: HOME | End: 2021-04-08
Payer: COMMERCIAL

## 2021-04-08 DIAGNOSIS — M54.12 RADICULOPATHY, CERVICAL: ICD-10-CM

## 2021-04-08 PROCEDURE — 72141 MRI NECK SPINE W/O DYE: CPT

## 2021-04-16 ENCOUNTER — HOSPITAL ENCOUNTER (OUTPATIENT)
Dept: GENERAL RADIOLOGY | Age: 66
Discharge: HOME | End: 2021-04-16
Payer: COMMERCIAL

## 2021-04-16 ENCOUNTER — HOSPITAL ENCOUNTER (OUTPATIENT)
Age: 66
Discharge: HOME | End: 2021-04-16
Payer: COMMERCIAL

## 2021-04-16 DIAGNOSIS — Z01.818 PREOP TESTING: ICD-10-CM

## 2021-04-16 LAB
APTT: 38.4 SECONDS (ref 22–38)
BASOPHILS # BLD: 0.6 %
BASOPHILS ABSOLUTE: 0 THOU/MM3 (ref 0–0.1)
EOSINOPHIL # BLD: 1.2 %
EOSINOPHILS ABSOLUTE: 0.1 THOU/MM3 (ref 0–0.4)
ERYTHROCYTE [DISTWIDTH] IN BLOOD BY AUTOMATED COUNT: 12.3 % (ref 11.5–14.5)
ERYTHROCYTE [DISTWIDTH] IN BLOOD BY AUTOMATED COUNT: 40.8 FL (ref 35–45)
HCT VFR BLD CALC: 43.7 % (ref 42–52)
HEMOGLOBIN: 14.3 GM/DL (ref 14–18)
IMMATURE GRANS (ABS): 0.01 THOU/MM3 (ref 0–0.07)
IMMATURE GRANULOCYTES: 0.2 %
INR BLD: 1.07 (ref 0.85–1.13)
LYMPHOCYTES # BLD: 42.4 %
LYMPHOCYTES ABSOLUTE: 2.1 THOU/MM3 (ref 1–4.8)
MCH RBC QN AUTO: 29.9 PG (ref 26–33)
MCHC RBC AUTO-ENTMCNC: 32.7 GM/DL (ref 32.2–35.5)
MCV RBC AUTO: 91.4 FL (ref 80–94)
MONOCYTES # BLD: 6.5 %
MONOCYTES ABSOLUTE: 0.3 THOU/MM3 (ref 0.4–1.3)
NUCLEATED RED BLOOD CELLS: 0 /100 WBC
PLATELET # BLD: 324 THOU/MM3 (ref 130–400)
PMV BLD AUTO: 9.4 FL (ref 9.4–12.4)
RBC # BLD: 4.78 MILL/MM3 (ref 4.7–6.1)
SEG NEUTROPHILS: 49.1 %
SEGMENTED NEUTROPHILS ABSOLUTE COUNT: 2.4 THOU/MM3 (ref 1.8–7.7)
WBC # BLD: 4.9 THOU/MM3 (ref 4.8–10.8)

## 2021-04-16 PROCEDURE — 80048 BASIC METABOLIC PNL TOTAL CA: CPT

## 2021-04-16 PROCEDURE — 93005 ELECTROCARDIOGRAM TRACING: CPT | Performed by: ORTHOPAEDIC SURGERY

## 2021-04-16 PROCEDURE — 85610 PROTHROMBIN TIME: CPT

## 2021-04-16 PROCEDURE — 36415 COLL VENOUS BLD VENIPUNCTURE: CPT

## 2021-04-16 PROCEDURE — 71046 X-RAY EXAM CHEST 2 VIEWS: CPT

## 2021-04-16 PROCEDURE — 85025 COMPLETE CBC W/AUTO DIFF WBC: CPT

## 2021-04-16 PROCEDURE — 85730 THROMBOPLASTIN TIME PARTIAL: CPT

## 2021-04-17 LAB
ANION GAP SERPL CALCULATED.3IONS-SCNC: 8 MEQ/L (ref 8–16)
BUN BLDV-MCNC: 15 MG/DL (ref 7–22)
CALCIUM SERPL-MCNC: 9.1 MG/DL (ref 8.5–10.5)
CHLORIDE BLD-SCNC: 100 MEQ/L (ref 98–111)
CO2: 30 MEQ/L (ref 23–33)
CREAT SERPL-MCNC: 1.1 MG/DL (ref 0.4–1.2)
EKG ATRIAL RATE: 64 BPM
EKG P AXIS: 56 DEGREES
EKG P-R INTERVAL: 172 MS
EKG Q-T INTERVAL: 398 MS
EKG QRS DURATION: 90 MS
EKG QTC CALCULATION (BAZETT): 410 MS
EKG R AXIS: 52 DEGREES
EKG T AXIS: 35 DEGREES
EKG VENTRICULAR RATE: 64 BPM
GFR SERPL CREATININE-BSD FRML MDRD: 67 ML/MIN/1.73M2
GLUCOSE BLD-MCNC: 88 MG/DL (ref 70–108)
POTASSIUM SERPL-SCNC: 3.7 MEQ/L (ref 3.5–5.2)
SODIUM BLD-SCNC: 138 MEQ/L (ref 135–145)

## 2021-04-17 PROCEDURE — 93010 ELECTROCARDIOGRAM REPORT: CPT | Performed by: NUCLEAR MEDICINE

## 2021-05-21 RX ORDER — HYDROCHLOROTHIAZIDE 25 MG/1
25 TABLET ORAL DAILY
Qty: 90 TABLET | Refills: 3 | Status: SHIPPED | OUTPATIENT
Start: 2021-05-21

## 2021-06-24 DIAGNOSIS — N20.0 NEPHROLITHIASIS: ICD-10-CM

## 2021-06-24 DIAGNOSIS — R82.994 HYPERCALCIURIA: ICD-10-CM

## 2021-06-25 RX ORDER — POTASSIUM CITRATE 10 MEQ/1
TABLET, EXTENDED RELEASE ORAL
Qty: 270 TABLET | Refills: 3 | Status: SHIPPED | OUTPATIENT
Start: 2021-06-25

## 2022-01-05 ENCOUNTER — HOSPITAL ENCOUNTER (EMERGENCY)
Age: 67
Discharge: HOME | End: 2022-01-05
Attending: EMERGENCY MEDICINE
Payer: COMMERCIAL

## 2022-01-05 VITALS
HEART RATE: 87 BPM | RESPIRATION RATE: 18 BRPM | WEIGHT: 190 LBS | HEIGHT: 71 IN | SYSTOLIC BLOOD PRESSURE: 134 MMHG | TEMPERATURE: 98 F | BODY MASS INDEX: 26.6 KG/M2 | DIASTOLIC BLOOD PRESSURE: 93 MMHG | OXYGEN SATURATION: 98 %

## 2022-01-05 DIAGNOSIS — J06.9 VIRAL URI: Primary | ICD-10-CM

## 2022-01-05 LAB — SARS-COV-2, NAAT: NOT  DETECTED

## 2022-01-05 PROCEDURE — 87635 SARS-COV-2 COVID-19 AMP PRB: CPT

## 2022-01-05 PROCEDURE — 99282 EMERGENCY DEPT VISIT SF MDM: CPT

## 2022-01-05 RX ORDER — FLUTICASONE PROPIONATE 50 MCG
1 SPRAY, SUSPENSION (ML) NASAL DAILY
Qty: 16 G | Refills: 0 | Status: SHIPPED | OUTPATIENT
Start: 2022-01-05

## 2022-01-05 RX ORDER — M-VIT,TX,IRON,MINS/CALC/FOLIC 27MG-0.4MG
1 TABLET ORAL DAILY
COMMUNITY

## 2022-01-05 ASSESSMENT — PAIN DESCRIPTION - LOCATION: LOCATION: GENERALIZED

## 2022-01-05 ASSESSMENT — ENCOUNTER SYMPTOMS
ABDOMINAL PAIN: 0
VOMITING: 0
EYE PAIN: 0
WHEEZING: 0
SORE THROAT: 0
BLOOD IN STOOL: 0
EYE DISCHARGE: 0
DIARRHEA: 0
SHORTNESS OF BREATH: 0

## 2022-01-05 ASSESSMENT — PAIN SCALES - GENERAL: PAINLEVEL_OUTOF10: 3

## 2022-01-05 ASSESSMENT — PAIN DESCRIPTION - PAIN TYPE: TYPE: ACUTE PAIN

## 2022-01-05 NOTE — ED PROVIDER NOTES
[address]  [address] [place]  Phone: [phone]    eMERGENCY dEPARTMENT eNCOUnter           [facility]       Chief Complaint   Patient presents with    Nasal Congestion    Generalized Body Aches       Nurses Notes reviewed and I agree except as noted in the HPI. HISTORY OF PRESENT ILLNESS    Joo Parham is a 61 y.o. male who presented via private vehicle with above-mentioned complaints. He presented with 1 day history of nasal congestion and generalized body ache. He has no fever or chills. He has no chest pain shortness of breath. He has no nausea or vomiting. REVIEW OF SYSTEMS     Review of Systems   Constitutional: Negative for chills and fever. HENT: Positive for congestion. Negative for sore throat. Eyes: Negative for pain and discharge. Respiratory: Negative for shortness of breath and wheezing. Cardiovascular: Negative for chest pain and palpitations. Gastrointestinal: Negative for abdominal pain, blood in stool, diarrhea and vomiting. Genitourinary: Negative for dysuria and hematuria. Musculoskeletal: Positive for myalgias. Negative for neck pain and neck stiffness. Neurological: Negative for seizures, syncope and headaches. Hematological: Negative for adenopathy. Psychiatric/Behavioral: Negative for agitation and hallucinations. PAST MEDICAL HISTORY    has a past medical history of Choreoathetosis, Hyperlipidemia, Kidney stones, and Renal lithiasis. SURGICAL HISTORY      has a past surgical history that includes hernia repair (Left, 1967?); Cystocopy (12/11/2012); knee surgery (Right, March 2013); knee surgery (Left); and Kidney stone removal.    CURRENT MEDICATIONS       Previous Medications    HYDROCHLOROTHIAZIDE (HYDRODIURIL) 25 MG TABLET    TAKE 1 TABLET BY MOUTH  DAILY    HYDROCODONE-ACETAMINOPHEN (NORCO) 5-325 MG PER TABLET    Take 1 tablet by mouth every 6 hours as needed.     MULTIPLE VITAMINS-MINERALS (THERAPEUTIC MULTIVITAMIN-MINERALS) TABLET    Take 1 tablet by mouth daily    POLYVINYL ALCOHOL-POVIDONE PF (REFRESH) 1.4-0.6 % SOLN    Apply 1 drop to eye as needed (dry or irritated eyes)    POTASSIUM CITRATE (UROCIT-K) 10 MEQ (1080 MG) EXTENDED RELEASE TABLET    TAKE 1 TABLET BY MOUTH 3  TIMES DAILY WITH MEALS    VITAMIN D (CHOLECALCIFEROL) 1000 UNITS CAPS CAPSULE    Take 1,000 Units by mouth daily        ALLERGIES     has No Known Allergies. FAMILY HISTORY     He indicated that the status of his mother is unknown. He indicated that the status of his neg hx is unknown.   family history includes Coronary Art Dis in his mother. SOCIAL HISTORY      reports that he has never smoked. He has never used smokeless tobacco. He reports that he does not drink alcohol and does not use drugs. PHYSICAL EXAM     INITIAL VITALS:  height is 5' 11\" (1.803 m) and weight is 190 lb (86.2 kg). His temperature is 98 °F (36.7 °C). His blood pressure is 134/93 (abnormal) and his pulse is 87. His respiration is 18 and oxygen saturation is 98%. Physical Exam  Vitals and nursing note reviewed. Constitutional:       General: He is not in acute distress. Appearance: He is not ill-appearing. HENT:      Nose: Congestion present. Mouth/Throat:      Pharynx: Oropharynx is clear. No oropharyngeal exudate. Comments: Minimal pharyngeal erythema  Cardiovascular:      Rate and Rhythm: Normal rate and regular rhythm. Pulses: Normal pulses. Heart sounds: No murmur heard. Pulmonary:      Effort: Pulmonary effort is normal.      Breath sounds: Normal breath sounds. Neurological:      Mental Status: He is alert.            DIFFERENTIAL DIAGNOSIS:     DIAGNOSTIC RESULTS         LABS:   Labs Reviewed   COVID-19, RAPID   COVID was negative    EMERGENCY DEPARTMENT COURSE:   Vitals:    Vitals:    01/05/22 0345   BP: (!) 134/93   Pulse: 87   Resp: 18   Temp: 98 °F (36.7 °C)   SpO2: 98%   Weight: 190 lb (86.2 kg)   Height: 5' 11\" (1.803 m)         FINAL IMPRESSION      1.  Viral URI          DISPOSITION/PLAN   Discharged home in good condition    PATIENT REFERRED TO:  JASPER Prasad - CNP  [address] [facility]  [phone]    In 2 days        DISCHARGE MEDICATIONS:  New Prescriptions    FLUTICASONE (FLONASE) 50 MCG/ACT NASAL SPRAY    1 spray by Each Nostril route daily       (Please note that portions of this note were completed with a voice recognition program.  Efforts were made to edit the dictations but occasionally words are mis-transcribed.)    MD Sai Jolly MD  01/05/22 4855

## 2022-01-05 NOTE — ED NOTES
Pt updated on wait time for result .   Darshan Orlando RN  01/05/22 Sanya Betancourt RN  01/05/22 8920

## 2022-01-05 NOTE — ED NOTES
Discharge teaching and instructions for condition explained to patient. AVS reviewed. Went over prescriptions with patient. Patient voiced understanding regarding prescriptions, follow up appointments and care of self at home. Pt discharged to home in stable condition per self.        Apollo Bell RN  01/05/22 9375

## 2022-01-05 NOTE — ED NOTES
Presents from home per self. C/o nasal congestion and body aches began yesterday with burning eyes. Known exposure 10 days ago to covid he states. Pt  Has had had covid in the past plus both vaccines and a booster. Also had flu vaccine this year. Denies sob or cp. He wants a covid swab. Swab obtained taken to lab.       Darshan Orlando RN  01/05/22 0352       Darshan Orlando RN  01/05/22 5588

## 2022-02-11 ENCOUNTER — HOSPITAL ENCOUNTER (OUTPATIENT)
Age: 67
Discharge: HOME | End: 2022-02-11
Payer: COMMERCIAL

## 2022-02-11 DIAGNOSIS — N20.0 NEPHROLITHIASIS: ICD-10-CM

## 2022-02-11 PROCEDURE — 80048 BASIC METABOLIC PNL TOTAL CA: CPT

## 2022-02-11 PROCEDURE — 36415 COLL VENOUS BLD VENIPUNCTURE: CPT

## 2022-02-12 LAB
ANION GAP SERPL CALCULATED.3IONS-SCNC: 13 MEQ/L (ref 8–16)
BUN BLDV-MCNC: 20 MG/DL (ref 7–22)
CALCIUM SERPL-MCNC: 9.4 MG/DL (ref 8.5–10.5)
CHLORIDE BLD-SCNC: 100 MEQ/L (ref 98–111)
CO2: 27 MEQ/L (ref 23–33)
CREAT SERPL-MCNC: 0.9 MG/DL (ref 0.4–1.2)
GFR SERPL CREATININE-BSD FRML MDRD: 85 ML/MIN/1.73M2
GLUCOSE BLD-MCNC: 90 MG/DL (ref 70–108)
POTASSIUM SERPL-SCNC: 4 MEQ/L (ref 3.5–5.2)
SODIUM BLD-SCNC: 140 MEQ/L (ref 135–145)

## 2022-02-14 ENCOUNTER — OFFICE VISIT (OUTPATIENT)
Dept: NEPHROLOGY | Age: 67
End: 2022-02-14
Payer: COMMERCIAL

## 2022-02-14 VITALS
OXYGEN SATURATION: 100 % | BODY MASS INDEX: 26.67 KG/M2 | DIASTOLIC BLOOD PRESSURE: 88 MMHG | WEIGHT: 191.2 LBS | TEMPERATURE: 97.2 F | HEART RATE: 59 BPM | SYSTOLIC BLOOD PRESSURE: 161 MMHG

## 2022-02-14 DIAGNOSIS — R82.994 HYPERCALCIURIA: ICD-10-CM

## 2022-02-14 DIAGNOSIS — N20.0 NEPHROLITHIASIS: Primary | ICD-10-CM

## 2022-02-14 PROCEDURE — 3017F COLORECTAL CA SCREEN DOC REV: CPT | Performed by: INTERNAL MEDICINE

## 2022-02-14 PROCEDURE — G8482 FLU IMMUNIZE ORDER/ADMIN: HCPCS | Performed by: INTERNAL MEDICINE

## 2022-02-14 PROCEDURE — 99213 OFFICE O/P EST LOW 20 MIN: CPT | Performed by: INTERNAL MEDICINE

## 2022-02-14 PROCEDURE — 1036F TOBACCO NON-USER: CPT | Performed by: INTERNAL MEDICINE

## 2022-02-14 PROCEDURE — G8419 CALC BMI OUT NRM PARAM NOF/U: HCPCS | Performed by: INTERNAL MEDICINE

## 2022-02-14 PROCEDURE — G8427 DOCREV CUR MEDS BY ELIG CLIN: HCPCS | Performed by: INTERNAL MEDICINE

## 2022-02-14 NOTE — PROGRESS NOTES
Renal Progress Note    Assessment and Plan:       Diagnosis Orders   1. Nephrolithiasis     2. Hypercalciuria       PLAN:  BMP report discussed with the patient. serum creatinine is normal.  Litholink studies are still pending  Medication reviewed  No changes  We will call the patient with report when available  Monitor blood pressure at home and call if it is high. Return visit in 12 months with labs    Patient Active Problem List   Diagnosis    Nephrolithiasis    Urolithiasis    Hypercalciuria    Arthritis of left hip           Subjective:   Chief complaint:  Chief Complaint   Patient presents with    Nephrolithiasis      HPI:This is a follow up visit for Mr. Carlos Keen here today for return appointment. I see him for nephrolithiasis. He was last seen about 12 months ago. Doing well since then. He was in emergency department about a month ago however for nasal congestion. He tested negative for SARS-CoV-2. Clement Fly No chest pain. No shortness of breath. No nausea vomiting, no fever chills. No difficulties with urination. Patient does not monitor his blood pressure at home. Blood pressure is high here in the office today however. He has no history of hypertension. He is on hydrochlorothiazide which is for nephrolithiasis not for blood pressure. ROS:  Pertinent positives stated above in HPI. All other systems were reviewed and were negative.   Medications:     Current Outpatient Medications   Medication Sig Dispense Refill    Multiple Vitamins-Minerals (THERAPEUTIC MULTIVITAMIN-MINERALS) tablet Take 1 tablet by mouth daily      fluticasone (FLONASE) 50 MCG/ACT nasal spray 1 spray by Each Nostril route daily 16 g 0    Polyvinyl Alcohol-Povidone PF (REFRESH) 1.4-0.6 % SOLN Apply 1 drop to eye as needed (dry or irritated eyes) 1 each 0    potassium citrate (UROCIT-K) 10 MEQ (1080 MG) extended release tablet TAKE 1 TABLET BY MOUTH 3  TIMES DAILY WITH MEALS 270 tablet 3    hydroCHLOROthiazide (HYDRODIURIL) 25 MG tablet TAKE 1 TABLET BY MOUTH  DAILY 90 tablet 3    Vitamin D (CHOLECALCIFEROL) 1000 UNITS CAPS capsule Take 1,000 Units by mouth daily        No current facility-administered medications for this visit.        Lab Results:    CBC:   Lab Results   Component Value Date    WBC 4.9 04/16/2021    HGB 14.3 04/16/2021    HCT 43.7 04/16/2021    MCV 91.4 04/16/2021     04/16/2021     BMP:    Lab Results   Component Value Date     02/11/2022     04/16/2021     02/11/2021    K 4.0 02/11/2022    K 3.7 04/16/2021    K 4.0 02/11/2021     02/11/2022     04/16/2021    CL 99 02/11/2021    CO2 27 02/11/2022    CO2 30 04/16/2021    CO2 32 02/11/2021    BUN 20 02/11/2022    BUN 15 04/16/2021    BUN 14 02/11/2021    CREATININE 0.9 02/11/2022    CREATININE 1.1 04/16/2021    CREATININE 0.9 02/11/2021    GLUCOSE 90 02/11/2022    GLUCOSE 88 04/16/2021    GLUCOSE 85 02/11/2021      Hepatic:   Lab Results   Component Value Date    AST 28 09/11/2012    ALT 25 09/11/2012    BILITOT 2.1 (H) 09/11/2012    ALKPHOS 50 09/11/2012     BNP: No results found for: BNP  Lipids:   Lab Results   Component Value Date    CHOL 200 (H) 03/15/2014    HDL 58 03/15/2014     INR:   Lab Results   Component Value Date    INR 1.07 04/16/2021     URINE: No results found for: NAUR, PROTUR  Lab Results   Component Value Date    NITRU Negative 04/10/2013    COLORU neg 04/19/2021    COLORU [place] 04/10/2013    PHUR 5.0 04/19/2021    PHUR 5.0 04/10/2013    WBCUA 0-2 09/11/2012    RBCUA 25-50 09/11/2012    MUCUS NONE SEEN 09/11/2012    YEAST NONE SEEN 09/11/2012    BACTERIA NONE 09/11/2012    CLARITYU neg 04/19/2021    CLARITYU Clear 04/10/2013    SPECGRAV 1.025 04/19/2021    LEUKOCYTESUR neg 04/19/2021    LEUKOCYTESUR NEGATIVE 09/11/2012    UROBILINOGEN 0.2 09/11/2012    BILIRUBINUR neg 04/19/2021    BLOODU neg 04/19/2021    BLOODU Negative 04/10/2013    GLUCOSEU neg 04/19/2021    GLUCOSEU neg 04/10/2013    KETUA neg 04/19/2021    KETUA Negative 04/10/2013    AMORPHOUS NONE SEEN 09/11/2012      Microalbumen/Creatinine ratio:  No components found for: RUCREAT    Objective:   Vitals: BP (!) 161/88 (Site: Right Upper Arm, Position: Sitting, Cuff Size: Large Adult)   Pulse 59   Temp 97.2 °F (36.2 °C)   Wt 191 lb 3.2 oz (86.7 kg)   SpO2 100%   BMI 26.67 kg/m²      Constitutional: Well-developed middle-age gentleman alert, awake, no apparent distress  Skin:normal with no rash or any significant lesions  HEENT:Pupils are reactive . Throat is clear. Oral mucosa is moist.  Neck:supple with no thyromegaly, JVD, lymphadenopathy or bruit   Cardiovascular: Regular sinus rhythm without murmur, rubs or gallops   Respiratory:  Clear to auscultation with no wheezes or rales  Abdomen: Good bowel sound, soft, non tender and no bruit  Ext: No LE edema  Musculoskeletal:Intact  Neuro:Alert, awake and oriented with no obvious focal deficit. Speech is normal.    Electronically signed by Violeta Enriquez MD on 2/14/2022 at 8:00 AM   **This report has been created using voice recognition software. It maycontain minor  errors which are inherent in voice recognition technology. **

## 2022-02-15 ENCOUNTER — TELEPHONE (OUTPATIENT)
Dept: NEPHROLOGY | Age: 67
End: 2022-02-15

## 2022-11-29 RX ORDER — HYDROCHLOROTHIAZIDE 25 MG/1
25 TABLET ORAL DAILY
Qty: 90 TABLET | Refills: 1 | Status: SHIPPED | OUTPATIENT
Start: 2022-11-29

## 2022-11-29 NOTE — TELEPHONE ENCOUNTER
Patient had to change pharmacy's due to work. He needs a new prescription to go to [facility] for the hydrochlorothiazide please. Next appointment is in [place]. [place] Pregnancy And Lactation Text: This medication is not safe during pregnancy. It is unknown if this medication is excreted in breast milk.

## 2023-01-31 DIAGNOSIS — N20.0 NEPHROLITHIASIS: ICD-10-CM

## 2023-02-02 LAB
ANION GAP SERPL CALCULATED.3IONS-SCNC: 8 MEQ/L (ref 7–16)
BUN BLDV-MCNC: 16 MG/DL (ref 8–23)
CALCIUM SERPL-MCNC: 9.7 MG/DL (ref 8.5–10.5)
CHLORIDE BLD-SCNC: 97 MEQ/L (ref 95–107)
CO2: 33 MEQ/L (ref 19–31)
CREAT SERPL-MCNC: 1.13 MG/DL (ref 0.8–1.4)
EGFR IF NONAFRICAN AMERICAN: 72 ML/MIN/1.73
GLUCOSE: 106 MG/DL (ref 70–99)
POTASSIUM SERPL-SCNC: 5 MEQ/L (ref 3.5–5.4)
SODIUM BLD-SCNC: 138 MEQ/L (ref 133–146)

## 2023-02-13 ENCOUNTER — OFFICE VISIT (OUTPATIENT)
Dept: NEPHROLOGY | Age: 68
End: 2023-02-13
Payer: COMMERCIAL

## 2023-02-13 VITALS
BODY MASS INDEX: 25.19 KG/M2 | DIASTOLIC BLOOD PRESSURE: 90 MMHG | HEIGHT: 72 IN | WEIGHT: 186 LBS | SYSTOLIC BLOOD PRESSURE: 145 MMHG | HEART RATE: 53 BPM | OXYGEN SATURATION: 100 %

## 2023-02-13 DIAGNOSIS — N20.0 NEPHROLITHIASIS: Primary | ICD-10-CM

## 2023-02-13 PROCEDURE — G8484 FLU IMMUNIZE NO ADMIN: HCPCS | Performed by: INTERNAL MEDICINE

## 2023-02-13 PROCEDURE — G8427 DOCREV CUR MEDS BY ELIG CLIN: HCPCS | Performed by: INTERNAL MEDICINE

## 2023-02-13 PROCEDURE — 1123F ACP DISCUSS/DSCN MKR DOCD: CPT | Performed by: INTERNAL MEDICINE

## 2023-02-13 PROCEDURE — G8417 CALC BMI ABV UP PARAM F/U: HCPCS | Performed by: INTERNAL MEDICINE

## 2023-02-13 PROCEDURE — 99214 OFFICE O/P EST MOD 30 MIN: CPT | Performed by: INTERNAL MEDICINE

## 2023-02-13 PROCEDURE — 3017F COLORECTAL CA SCREEN DOC REV: CPT | Performed by: INTERNAL MEDICINE

## 2023-02-13 PROCEDURE — 1036F TOBACCO NON-USER: CPT | Performed by: INTERNAL MEDICINE

## 2023-02-13 NOTE — PROGRESS NOTES
Renal Progress Note    Assessment and Plan:      Diagnosis Orders   1. Nephrolithiasis                  PLAN:  I discussed my thoughts with the patient. He understood well. I addressed his questions. Labs reviewed. Kidney function is good. [facility] report reviewed with him. Urine volume is lower than expected at 1.8 L. Goal is 2.0 L or better  Urine pH is higher than expected  Medications reviewed  No changes  Low-salt diet information provided to the patient  Monitor blood pressure at home  Return visit in 12 months with labs          Patient Active Problem List   Diagnosis    Nephrolithiasis    Urolithiasis    Hypercalciuria    Arthritis of left hip           Subjective:   Chief complaint:No chief complaint on file. HPI:This is a follow up visit for Mr. Clarence Cazares who is here today for return appointment. We see him for nephrolithiasis. He was last seen about 12 months ago. Doing well. No kidney stone since then. No emergency room visit. No hospitalization. No chest pain. No shortness of breath. Appetite is good. Urinates well. ROS:  Pertinent positives stated above in HPI. All other systems were reviewed and were negative. Medications:     Current Outpatient Medications   Medication Sig Dispense Refill    atorvastatin (LIPITOR) 10 MG tablet Take 1 tablet by mouth nightly 30 tablet 11    hydroCHLOROthiazide (HYDRODIURIL) 25 MG tablet Take 1 tablet by mouth daily 90 tablet 1    Multiple Vitamins-Minerals (THERAPEUTIC MULTIVITAMIN-MINERALS) tablet Take 1 tablet by mouth daily      potassium citrate (UROCIT-K) 10 MEQ (1080 MG) extended release tablet TAKE 1 TABLET BY MOUTH 3  TIMES DAILY WITH MEALS 270 tablet 3     No current facility-administered medications for this visit.        Lab Results:    CBC:   Lab Results   Component Value Date    WBC 5.4 02/01/2023    HGB 15.7 02/01/2023    HCT 46.6 02/01/2023    MCV 89.1 02/01/2023     02/01/2023     BMP:    Lab Results   Component Value Date     02/01/2023     02/11/2022     04/16/2021    K 5.0 02/01/2023    K 4.0 02/11/2022    K 3.7 04/16/2021    CL 97 02/01/2023     02/11/2022     04/16/2021    CO2 33 (H) 02/01/2023    CO2 27 02/11/2022    CO2 30 04/16/2021    BUN 16 02/01/2023    BUN 20 02/11/2022    BUN 15 04/16/2021    CREATININE 1.13 02/01/2023    CREATININE 0.9 02/11/2022    CREATININE 1.1 04/16/2021    GLUCOSE 106 (H) 02/01/2023    GLUCOSE 90 02/11/2022    GLUCOSE 88 04/16/2021      Hepatic:   Lab Results   Component Value Date    AST 31 02/01/2023    AST 28 09/11/2012    ALT 24 02/01/2023    ALT 25 09/11/2012    BILITOT 1.9 (H) 02/01/2023    BILITOT 2.1 (H) 09/11/2012    ALKPHOS 65 02/01/2023    ALKPHOS 50 09/11/2012     BNP: No results found for: BNP  Lipids:   Lab Results   Component Value Date    CHOL 261 (H) 02/01/2023    HDL 63 02/01/2023     INR:   Lab Results   Component Value Date    INR 1.07 04/16/2021     URINE: No results found for: NAUR, PROTUR  Lab Results   Component Value Date/Time    NITRU Negative 04/10/2013 09:28 AM    COLORU neg 04/19/2021 03:21 PM    COLORU [place] 04/10/2013 09:28 AM    PHUR 7.5 01/30/2023 06:32 PM    PHUR 5.0 04/10/2013 09:28 AM    WBCUA 0-2 09/11/2012 11:50 PM    RBCUA 25-50 09/11/2012 11:50 PM    MUCUS NONE SEEN 09/11/2012 11:50 PM    YEAST NONE SEEN 09/11/2012 11:50 PM    BACTERIA NONE 09/11/2012 11:50 PM    CLARITYU neg 04/19/2021 03:21 PM    CLARITYU Clear 04/10/2013 09:28 AM    SPECGRAV 1.015 01/30/2023 06:32 PM    LEUKOCYTESUR neg 01/30/2023 06:32 PM    LEUKOCYTESUR NEGATIVE 09/11/2012 11:50 PM    UROBILINOGEN 0.2 09/11/2012 11:50 PM    BILIRUBINUR neg 01/30/2023 06:32 PM    BLOODU neg 01/30/2023 06:32 PM    BLOODU Negative 04/10/2013 09:28 AM    GLUCOSEU neg 01/30/2023 06:32 PM    GLUCOSEU neg 04/10/2013 09:28 AM    KETUA neg 01/30/2023 06:32 PM    KETUA Negative 04/10/2013 09:28 AM    AMORPHOUS NONE SEEN 09/11/2012 11:50 PM      Microalbumen/Creatinine ratio:  No components found for: RUCREAT    Objective:   Vitals: BP (!) 145/90 (Site: Right Upper Arm, Position: Sitting, Cuff Size: Small Adult)   Pulse 53   Ht 6' (1.829 m)   Wt 186 lb (84.4 kg)   SpO2 100%   BMI 25.23 kg/m²      Constitutional:  Alert, awake, no apparent distress  Skin:normal with no rash or any significant lesions  HEENT:Pupils are reactive . Throat is clear. Oral mucosa is moist.  Neck:supple with no thyromegaly, JVD, lymphadenopathy or bruit **  Cardiovascular: Regular sinus rhythm without murmur, rubs or gallops   Respiratory:  Clear to auscultation with no wheezes or rales  Abdomen: Good bowel sound, soft, non tender and no bruit  Ext: no LE edema  Musculoskeletal:Intact  Neuro:Alert, awake and oriented with no obvious focal deficit. Speech is normal.**    Electronically signed by Shameka Lyons MD on 2/13/2023 at 8:00 AM   **This report has been created using voice recognition software. It maycontain minor  errors which are inherent in voice recognition technology. **

## 2023-02-13 NOTE — PATIENT INSTRUCTIONS
Low Sodium Diet (2,000 Milligram/2 Gm)    The most common source of sodium is salt. People get most of the salt in their diet from canned, prepared, and packaged foods. Fast food and restaurant meals also are very high in sodium. Limit your sodium to less than 2,000 milligrams (mg) a day. This limit counts all the sodium in prepared and packaged foods and any salt you add to your food. And try to further reduce how much sodium you eat to less than 1,500 mg a day if you are 46 or older, are black, or have high blood pressure, diabetes, or chronic kidney disease. Buy low-sodium foods   Buy foods that are labeled \"unsalted\" (no salt added), \"sodium-free\" (less than 5 mg of sodium per serving), or \"low-sodium\" (less than 140 mg of sodium per serving). Foods labeled \"reduced-sodium\" and \"light sodium\" may still have too much sodium. Be sure to read the label to see how much sodium you are getting. Buy fresh vegetables, or frozen vegetables without added sauces. Buy low-sodium versions of canned vegetables, soups, and other canned goods. Prepare low-sodium meals   Cut back on the amount of salt you use in cooking. This will help you adjust to the taste. Do not add salt after cooking. One teaspoon of salt has about 2,300 mg of sodium. Take the salt shaker off the table. Flavor your food with garlic, lemon juice, onion, vinegar, herbs, and spices. Do not use soy sauce, lite soy sauce, steak sauce, onion salt, garlic salt, celery salt, mustard, or ketchup on your food. Use low-sodium salad dressings, sauces, and ketchup. Or make your own salad dressings and sauces without adding salt. Use less salt (or none) when recipes call for it. You can often use half the salt a recipe calls for without losing flavor. Other foods such as rice, pasta, and grains do not need added salt. Rinse canned vegetables, and cook them in fresh water. This removes some--but not all--of the salt.    Avoid water that is naturally high in sodium or that has been treated with water softeners, which add sodium. Call your local water company to find out the sodium content of your water supply. If you buy bottled water, read the label and choose a sodium-free brand. Avoid high-sodium foods   Avoid eating:   Smoked, cured, salted, and canned meat, fish, and poultry. Ham, meraz, hot dogs, and luncheon meats. Regular, hard, and processed cheese such as American, Jaren Rings  Regular peanut butter. Crackers with salted tops, and other salted snack foods such as pretzels, chips, and salted popcorn. Frozen prepared meals, unless labeled low-sodium. Canned and dried soups, broths, and bouillon, unless labeled sodium-free or low-sodium. Canned vegetables, unless labeled sodium-free or low-sodium. Dinner kits such as hamburger and pasta meals  Frozen meal- entrees, dinners, vegetable with sauce  Western Lisa fries, pizza, tacos, and other fast foods. Instant cooking foods to which you add hot water and stir-Potatoes, cereals, noodles  Packaged starch foods-seasoned noodle or rice dish, stuffing mix, macaroni and cheese dinner  Pickles, olives, ketchup, and other condiments, especially soy sauce, unless labeled sodium-free or low-sodium. How to Read a Food Label to Limit Sodium: After Your Visit     Read ingredient lists on food labels   Read the list of ingredients on food labels to help you find how much sodium is in a food. The label lists the ingredients in a food in descending order (from the most to the least). If salt or sodium is high on the list, there may be a lot of sodium in the food. Know that sodium has different names. Sodium is also called monosodium glutamate (MSG, common in [facility] food), sodium citrate, sodium alginate, sodium hydroxide, and sodium phosphate. Read Nutrition Facts labels   On most foods, there is a Nutrition Facts label.  This will tell you how much sodium is in one serving of food. Look at both the serving size and the sodium amount. The serving size is located at the top of the label, usually right under the \"Nutrition Facts\" title. The amount of sodium is given in the list under the title. It is given in milligrams (mg). Check the serving size carefully. A single serving is often very small, and you may eat more than one serving. If this is the case, you will eat more sodium than listed on the label. For example, if the serving size for a canned soup is 1 cup and the sodium amount is 470 mg, if you have 2 cups you will eat 940 mg of sodium. The nutrition facts for fresh fruits and vegetables are not listed on the food. They may be listed somewhere in the store. These foods usually have no sodium or low sodium. The Nutrition Facts label also gives you the Percent Daily Value for sodium. This is how much of the recommended amount of sodium a serving contains. The daily value for sodium is less than 2,300 mg. So if the Percent Daily Value says 50%, this means one serving is giving you half of this, or 1,150 mg.

## 2023-04-15 ENCOUNTER — HOSPITAL ENCOUNTER (OUTPATIENT)
Age: 68
Discharge: HOME | End: 2023-04-15
Payer: COMMERCIAL

## 2023-04-15 DIAGNOSIS — N20.0 NEPHROLITHIASIS: ICD-10-CM

## 2023-04-15 DIAGNOSIS — E78.2 MIXED HYPERLIPIDEMIA: ICD-10-CM

## 2023-04-15 LAB
ALBUMIN SERPL BCG-MCNC: 4.6 G/DL (ref 3.5–5.1)
ALP SERPL-CCNC: 59 U/L (ref 38–126)
ALT SERPL W/O P-5'-P-CCNC: 29 U/L (ref 11–66)
ANION GAP SERPL CALC-SCNC: 8 MEQ/L (ref 8–16)
AST SERPL-CCNC: 36 U/L (ref 5–40)
BILIRUB CONJ SERPL-MCNC: 0.3 MG/DL (ref 0–0.3)
BILIRUB SERPL-MCNC: 1.6 MG/DL (ref 0.3–1.2)
BUN SERPL-MCNC: 22 MG/DL (ref 7–22)
CALCIUM SERPL-MCNC: 9.3 MG/DL (ref 8.5–10.5)
CHLORIDE SERPL-SCNC: 104 MEQ/L (ref 98–111)
CHOLESTEROL, FASTING: 171 MG/DL (ref 100–199)
CO2 SERPL-SCNC: 30 MEQ/L (ref 23–33)
CREAT SERPL-MCNC: 1.1 MG/DL (ref 0.4–1.2)
GFR SERPL CREATININE-BSD FRML MDRD: > 60 ML/MIN/1.73M2
GLUCOSE SERPL-MCNC: 105 MG/DL (ref 70–108)
HDLC SERPL-MCNC: 75 MG/DL
LDLC SERPL CALC-MCNC: 89 MG/DL
POTASSIUM SERPL-SCNC: 3.8 MEQ/L (ref 3.5–5.2)
PROT SERPL-MCNC: 7 G/DL (ref 6.1–8)
SODIUM SERPL-SCNC: 142 MEQ/L (ref 135–145)
TRIGLYCERIDE, FASTING: 36 MG/DL (ref 0–199)

## 2023-04-15 PROCEDURE — 80061 LIPID PANEL: CPT

## 2023-04-15 PROCEDURE — 36415 COLL VENOUS BLD VENIPUNCTURE: CPT

## 2023-04-15 PROCEDURE — 80053 COMPREHEN METABOLIC PANEL: CPT

## 2023-04-15 PROCEDURE — 82248 BILIRUBIN DIRECT: CPT

## 2023-06-06 RX ORDER — HYDROCHLOROTHIAZIDE 25 MG/1
TABLET ORAL
Qty: 90 TABLET | Refills: 3 | Status: SHIPPED | OUTPATIENT
Start: 2023-06-06

## 2024-02-05 ENCOUNTER — HOSPITAL ENCOUNTER (OUTPATIENT)
Age: 69
Discharge: HOME | End: 2024-02-05
Payer: MEDICARE

## 2024-02-05 LAB
ANION GAP SERPL CALC-SCNC: 5 MEQ/L (ref 8–16)
BUN SERPL-MCNC: 12 MG/DL (ref 7–18)
CALCIUM SERPL-MCNC: 9.3 MG/DL (ref 8.5–10.1)
CHLORIDE SERPL-SCNC: 105 MEQ/L (ref 98–107)
CO2 SERPL-SCNC: 35 MEQ/L (ref 21–32)
CREAT SERPL-MCNC: 1.3 MG/DL (ref 0.6–1.3)
GFR SERPL CREATININE-BSD FRML MDRD: 60 ML/MIN/1.73M2
GLUCOSE SERPL-MCNC: 108 MG/DL (ref 74–106)
POTASSIUM SERPL-SCNC: 4.7 MEQ/L (ref 3.5–5.1)
SODIUM SERPL-SCNC: 145 MEQ/L (ref 136–145)

## 2024-02-05 PROCEDURE — 80048 BASIC METABOLIC PNL TOTAL CA: CPT

## 2024-02-05 PROCEDURE — 36415 COLL VENOUS BLD VENIPUNCTURE: CPT

## 2024-02-07 DIAGNOSIS — N20.0 NEPHROLITHIASIS: ICD-10-CM

## 2024-02-12 ENCOUNTER — OFFICE VISIT (OUTPATIENT)
Dept: NEPHROLOGY | Age: 69
End: 2024-02-12
Payer: MEDICARE

## 2024-02-12 VITALS
DIASTOLIC BLOOD PRESSURE: 89 MMHG | WEIGHT: 183 LBS | SYSTOLIC BLOOD PRESSURE: 144 MMHG | HEART RATE: 57 BPM | HEIGHT: 72 IN | OXYGEN SATURATION: 99 % | BODY MASS INDEX: 24.79 KG/M2

## 2024-02-12 DIAGNOSIS — E78.5 DYSLIPIDEMIA: ICD-10-CM

## 2024-02-12 DIAGNOSIS — R82.994 HYPERCALCIURIA: ICD-10-CM

## 2024-02-12 DIAGNOSIS — N20.0 NEPHROLITHIASIS: Primary | ICD-10-CM

## 2024-02-12 DIAGNOSIS — N18.2 CKD (CHRONIC KIDNEY DISEASE), STAGE II: ICD-10-CM

## 2024-02-12 PROCEDURE — 99214 OFFICE O/P EST MOD 30 MIN: CPT | Performed by: INTERNAL MEDICINE

## 2024-02-12 PROCEDURE — 1123F ACP DISCUSS/DSCN MKR DOCD: CPT | Performed by: INTERNAL MEDICINE

## 2024-02-12 RX ORDER — POTASSIUM CITRATE 10 MEQ/1
10 TABLET, EXTENDED RELEASE ORAL
Qty: 270 TABLET | Refills: 3 | Status: SHIPPED | OUTPATIENT
Start: 2024-02-12

## 2024-07-03 ENCOUNTER — APPOINTMENT (OUTPATIENT)
Dept: ULTRASOUND IMAGING | Age: 69
End: 2024-07-03
Payer: MEDICARE

## 2024-07-03 ENCOUNTER — HOSPITAL ENCOUNTER (INPATIENT)
Age: 69
LOS: 5 days | Discharge: HOME | End: 2024-07-08
Attending: EMERGENCY MEDICINE | Admitting: PHYSICIAN ASSISTANT
Payer: MEDICARE

## 2024-07-03 DIAGNOSIS — E87.5 HYPERKALEMIA: ICD-10-CM

## 2024-07-03 DIAGNOSIS — R94.31 ABNORMAL ELECTROCARDIOGRAPHY: ICD-10-CM

## 2024-07-03 DIAGNOSIS — N17.9 AKI (ACUTE KIDNEY INJURY) (HCC): Primary | ICD-10-CM

## 2024-07-03 LAB
ALBUMIN SERPL BCG-MCNC: 3.9 G/DL (ref 3.5–5.1)
ALP SERPL-CCNC: 283 U/L (ref 38–126)
ALT SERPL W/O P-5'-P-CCNC: 56 U/L (ref 11–66)
ANION GAP SERPL CALC-SCNC: 13 MEQ/L (ref 8–16)
AST SERPL-CCNC: 40 U/L (ref 5–40)
BACTERIA URNS QL MICRO: ABNORMAL /HPF
BASOPHILS ABSOLUTE: 0 THOU/MM3 (ref 0–0.1)
BASOPHILS NFR BLD AUTO: 0.5 %
BILIRUB SERPL-MCNC: 0.4 MG/DL (ref 0.3–1.2)
BILIRUB UR QL STRIP.AUTO: NEGATIVE
BUN SERPL-MCNC: 57 MG/DL (ref 7–22)
CALCIUM SERPL-MCNC: 9.1 MG/DL (ref 8.5–10.5)
CASTS #/AREA URNS LPF: ABNORMAL /LPF
CASTS 2: ABNORMAL /LPF
CHARACTER UR: CLEAR
CHLORIDE SERPL-SCNC: 104 MEQ/L (ref 98–111)
CK SERPL-CCNC: 63 U/L (ref 55–170)
CO2 SERPL-SCNC: 20 MEQ/L (ref 23–33)
COLOR, UA: YELLOW
CREAT SERPL-MCNC: 6.9 MG/DL (ref 0.4–1.2)
CRYSTALS URNS MICRO: ABNORMAL
DEPRECATED RDW RBC AUTO: 43.2 FL (ref 35–45)
EKG ATRIAL RATE: 77 BPM
EKG P AXIS: 10 DEGREES
EKG P-R INTERVAL: 154 MS
EKG Q-T INTERVAL: 352 MS
EKG QRS DURATION: 76 MS
EKG QTC CALCULATION (BAZETT): 398 MS
EKG R AXIS: 39 DEGREES
EKG T AXIS: 39 DEGREES
EKG VENTRICULAR RATE: 77 BPM
EOSINOPHIL NFR BLD AUTO: 2.3 %
EOSINOPHIL SMEAR, URINE: NORMAL
EOSINOPHILS ABSOLUTE: 0.2 THOU/MM3 (ref 0–0.4)
EPITHELIAL CELLS, UA: ABNORMAL /HPF
ERYTHROCYTE [DISTWIDTH] IN BLOOD BY AUTOMATED COUNT: 12.4 % (ref 11.5–14.5)
GFR SERPL CREATININE-BSD FRML MDRD: 8 ML/MIN/1.73M2
GLUCOSE BLD STRIP.AUTO-MCNC: 106 MG/DL (ref 70–108)
GLUCOSE BLD STRIP.AUTO-MCNC: 109 MG/DL (ref 70–108)
GLUCOSE BLD STRIP.AUTO-MCNC: 111 MG/DL (ref 70–108)
GLUCOSE BLD STRIP.AUTO-MCNC: 117 MG/DL (ref 70–108)
GLUCOSE BLD STRIP.AUTO-MCNC: 73 MG/DL (ref 70–108)
GLUCOSE BLD STRIP.AUTO-MCNC: 99 MG/DL (ref 70–108)
GLUCOSE SERPL-MCNC: 134 MG/DL (ref 70–108)
GLUCOSE UR QL STRIP.AUTO: NEGATIVE MG/DL
HCT VFR BLD AUTO: 39.2 % (ref 42–52)
HGB BLD-MCNC: 12.5 GM/DL (ref 14–18)
HGB UR QL STRIP.AUTO: NEGATIVE
IMM GRANULOCYTES # BLD AUTO: 0.03 THOU/MM3 (ref 0–0.07)
IMM GRANULOCYTES NFR BLD AUTO: 0.4 %
KETONES UR QL STRIP.AUTO: NEGATIVE
LYMPHOCYTES ABSOLUTE: 1.1 THOU/MM3 (ref 1–4.8)
LYMPHOCYTES NFR BLD AUTO: 13.3 %
MAGNESIUM SERPL-MCNC: 1.8 MG/DL (ref 1.6–2.4)
MCH RBC QN AUTO: 30.3 PG (ref 26–33)
MCHC RBC AUTO-ENTMCNC: 31.9 GM/DL (ref 32.2–35.5)
MCV RBC AUTO: 94.9 FL (ref 80–94)
MISCELLANEOUS 2: ABNORMAL
MONOCYTES ABSOLUTE: 0.5 THOU/MM3 (ref 0.4–1.3)
MONOCYTES NFR BLD AUTO: 6 %
NEUTROPHILS ABSOLUTE: 6.5 THOU/MM3 (ref 1.8–7.7)
NEUTROPHILS NFR BLD AUTO: 77.5 %
NITRITE UR QL STRIP: NEGATIVE
NRBC BLD AUTO-RTO: 0 /100 WBC
OSMOLALITY SERPL CALC.SUM OF ELEC: 291.6 MOSMOL/KG (ref 275–300)
PH UR STRIP.AUTO: 7 [PH] (ref 5–9)
PHOSPHATE SERPL-MCNC: 4.9 MG/DL (ref 2.4–4.7)
PLATELET # BLD AUTO: 413 THOU/MM3 (ref 130–400)
PMV BLD AUTO: 9 FL (ref 9.4–12.4)
POTASSIUM SERPL-SCNC: 5.8 MEQ/L (ref 3.5–5.2)
POTASSIUM SERPL-SCNC: 5.8 MEQ/L (ref 3.5–5.2)
POTASSIUM SERPL-SCNC: 5.9 MEQ/L (ref 3.5–5.2)
PROT SERPL-MCNC: 8 G/DL (ref 6.1–8)
PROT UR STRIP.AUTO-MCNC: 30 MG/DL
RBC # BLD AUTO: 4.13 MILL/MM3 (ref 4.7–6.1)
RBC URINE: ABNORMAL /HPF
RENAL EPI CELLS #/AREA URNS HPF: ABNORMAL /[HPF]
SODIUM SERPL-SCNC: 137 MEQ/L (ref 135–145)
SP GR UR REFRACT.AUTO: 1.01 (ref 1–1.03)
TSH SERPL DL<=0.005 MIU/L-ACNC: 0.79 UIU/ML (ref 0.4–4.2)
UROBILINOGEN, URINE: 0.2 EU/DL (ref 0–1)
WBC # BLD AUTO: 8.4 THOU/MM3 (ref 4.8–10.8)
WBC #/AREA URNS HPF: ABNORMAL /HPF
WBC #/AREA URNS HPF: ABNORMAL /[HPF]
YEAST LIKE FUNGI URNS QL MICRO: ABNORMAL

## 2024-07-03 PROCEDURE — 93005 ELECTROCARDIOGRAM TRACING: CPT | Performed by: EMERGENCY MEDICINE

## 2024-07-03 PROCEDURE — 82550 ASSAY OF CK (CPK): CPT

## 2024-07-03 PROCEDURE — 84443 ASSAY THYROID STIM HORMONE: CPT

## 2024-07-03 PROCEDURE — 84132 ASSAY OF SERUM POTASSIUM: CPT

## 2024-07-03 PROCEDURE — 36415 COLL VENOUS BLD VENIPUNCTURE: CPT

## 2024-07-03 PROCEDURE — 99222 1ST HOSP IP/OBS MODERATE 55: CPT | Performed by: INTERNAL MEDICINE

## 2024-07-03 PROCEDURE — 80053 COMPREHEN METABOLIC PANEL: CPT

## 2024-07-03 PROCEDURE — 89190 NASAL SMEAR FOR EOSINOPHILS: CPT

## 2024-07-03 PROCEDURE — 84100 ASSAY OF PHOSPHORUS: CPT

## 2024-07-03 PROCEDURE — 76770 US EXAM ABDO BACK WALL COMP: CPT

## 2024-07-03 PROCEDURE — 99223 1ST HOSP IP/OBS HIGH 75: CPT | Performed by: PHYSICIAN ASSISTANT

## 2024-07-03 PROCEDURE — 2580000003 HC RX 258: Performed by: INTERNAL MEDICINE

## 2024-07-03 PROCEDURE — 87086 URINE CULTURE/COLONY COUNT: CPT

## 2024-07-03 PROCEDURE — 83735 ASSAY OF MAGNESIUM: CPT

## 2024-07-03 PROCEDURE — 85025 COMPLETE CBC W/AUTO DIFF WBC: CPT

## 2024-07-03 PROCEDURE — 1200000003 HC TELEMETRY R&B

## 2024-07-03 PROCEDURE — 81001 URINALYSIS AUTO W/SCOPE: CPT

## 2024-07-03 PROCEDURE — 82948 REAGENT STRIP/BLOOD GLUCOSE: CPT

## 2024-07-03 PROCEDURE — 93010 ELECTROCARDIOGRAM REPORT: CPT | Performed by: INTERNAL MEDICINE

## 2024-07-03 PROCEDURE — 99285 EMERGENCY DEPT VISIT HI MDM: CPT

## 2024-07-03 PROCEDURE — 6370000000 HC RX 637 (ALT 250 FOR IP): Performed by: INTERNAL MEDICINE

## 2024-07-03 RX ORDER — SODIUM CHLORIDE 9 MG/ML
INJECTION, SOLUTION INTRAVENOUS CONTINUOUS
Status: DISCONTINUED | OUTPATIENT
Start: 2024-07-03 | End: 2024-07-07

## 2024-07-03 RX ORDER — POLYETHYLENE GLYCOL 3350 17 G/17G
17 POWDER, FOR SOLUTION ORAL DAILY PRN
Status: DISCONTINUED | OUTPATIENT
Start: 2024-07-03 | End: 2024-07-08 | Stop reason: HOSPADM

## 2024-07-03 RX ORDER — CALCITONIN SALMON 200 [USP'U]/ML
100 INJECTION, SOLUTION INTRAMUSCULAR; SUBCUTANEOUS ONCE
Status: DISCONTINUED | OUTPATIENT
Start: 2024-07-03 | End: 2024-07-03

## 2024-07-03 RX ORDER — GLUCAGON 1 MG/ML
1 KIT INJECTION PRN
Status: DISCONTINUED | OUTPATIENT
Start: 2024-07-03 | End: 2024-07-08 | Stop reason: HOSPADM

## 2024-07-03 RX ORDER — HEPARIN SODIUM 5000 [USP'U]/ML
5000 INJECTION, SOLUTION INTRAVENOUS; SUBCUTANEOUS EVERY 8 HOURS SCHEDULED
Status: DISCONTINUED | OUTPATIENT
Start: 2024-07-03 | End: 2024-07-08 | Stop reason: HOSPADM

## 2024-07-03 RX ORDER — DEXTROSE MONOHYDRATE 100 MG/ML
INJECTION, SOLUTION INTRAVENOUS CONTINUOUS PRN
Status: DISCONTINUED | OUTPATIENT
Start: 2024-07-03 | End: 2024-07-08 | Stop reason: HOSPADM

## 2024-07-03 RX ORDER — SODIUM CHLORIDE 0.9 % (FLUSH) 0.9 %
5-40 SYRINGE (ML) INJECTION PRN
Status: DISCONTINUED | OUTPATIENT
Start: 2024-07-03 | End: 2024-07-08 | Stop reason: HOSPADM

## 2024-07-03 RX ORDER — SODIUM CHLORIDE 0.9 % (FLUSH) 0.9 %
5-40 SYRINGE (ML) INJECTION EVERY 12 HOURS SCHEDULED
Status: DISCONTINUED | OUTPATIENT
Start: 2024-07-03 | End: 2024-07-08 | Stop reason: HOSPADM

## 2024-07-03 RX ORDER — ACETAMINOPHEN 325 MG/1
650 TABLET ORAL EVERY 6 HOURS PRN
Status: DISCONTINUED | OUTPATIENT
Start: 2024-07-03 | End: 2024-07-08 | Stop reason: HOSPADM

## 2024-07-03 RX ORDER — ONDANSETRON 2 MG/ML
4 INJECTION INTRAMUSCULAR; INTRAVENOUS EVERY 6 HOURS PRN
Status: DISCONTINUED | OUTPATIENT
Start: 2024-07-03 | End: 2024-07-08 | Stop reason: HOSPADM

## 2024-07-03 RX ORDER — ONDANSETRON 4 MG/1
4 TABLET, ORALLY DISINTEGRATING ORAL EVERY 8 HOURS PRN
Status: DISCONTINUED | OUTPATIENT
Start: 2024-07-03 | End: 2024-07-08 | Stop reason: HOSPADM

## 2024-07-03 RX ORDER — ACETAMINOPHEN 650 MG/1
650 SUPPOSITORY RECTAL EVERY 6 HOURS PRN
Status: DISCONTINUED | OUTPATIENT
Start: 2024-07-03 | End: 2024-07-08 | Stop reason: HOSPADM

## 2024-07-03 RX ORDER — SODIUM CHLORIDE 9 MG/ML
INJECTION, SOLUTION INTRAVENOUS PRN
Status: DISCONTINUED | OUTPATIENT
Start: 2024-07-03 | End: 2024-07-08 | Stop reason: HOSPADM

## 2024-07-03 RX ADMIN — INSULIN HUMAN 10 UNITS: 100 INJECTION, SOLUTION PARENTERAL at 18:25

## 2024-07-03 RX ADMIN — SODIUM CHLORIDE: 9 INJECTION, SOLUTION INTRAVENOUS at 21:50

## 2024-07-03 RX ADMIN — SODIUM ZIRCONIUM CYCLOSILICATE 10 G: 10 POWDER, FOR SUSPENSION ORAL at 13:12

## 2024-07-03 RX ADMIN — SODIUM CHLORIDE: 9 INJECTION, SOLUTION INTRAVENOUS at 13:02

## 2024-07-03 RX ADMIN — DEXTROSE MONOHYDRATE 250 ML: 100 INJECTION, SOLUTION INTRAVENOUS at 18:26

## 2024-07-03 RX ADMIN — SODIUM ZIRCONIUM CYCLOSILICATE 10 G: 10 POWDER, FOR SUSPENSION ORAL at 18:27

## 2024-07-03 RX ADMIN — SODIUM ZIRCONIUM CYCLOSILICATE 10 G: 10 POWDER, FOR SUSPENSION ORAL at 21:41

## 2024-07-03 ASSESSMENT — PAIN SCALES - GENERAL: PAINLEVEL_OUTOF10: 0

## 2024-07-03 ASSESSMENT — PAIN - FUNCTIONAL ASSESSMENT: PAIN_FUNCTIONAL_ASSESSMENT: 0-10

## 2024-07-03 NOTE — H&P
Hospitalist History & Physical    Patient:  Servando Saha    Unit/Bed:6K-22/022-A  YOB: 1955  MRN: [id number]   Acct: [id number]   PCP: Apurva Lozano APRN - CNP  Code Status: Full Code    Date of Service: The patient was seen and examined on 07/03/24 and admitted to Inpatient with an expected length of stay of greater than two midnights due to medical therapy.     Chief Complaint: Fatigue    Assessment/Plan:    Acute kidney injury  Creatinine 6.9, baseline around 1.1-1.3.  Nephrology following.  Urinalysis with microscopy, urine eosinophils, renal ultrasound pending.  Patient started on normal saline at 150 mL/h.  Strict ins and outs.  Avoid nephrotoxins.  Hyperkalemia  Potassium 5.9.  Likely secondary to acute kidney injury.  No EKG changes.  Dose of Lokelma ordered.  Recheck potassium.  Abnormal urinalysis  Urinalysis on 7/1/2024 with 30 protein, small blood, small leukocytes.  Patient was started on Omnicef for potential urinary tract infection.  However, the patient does not have any urinary frequency, urgency, or dysuria.  As such, antibiotics would not be continued at this time.  Repeat urinalysis is pending.  Hypercalciuria, history of nephrolithiasis  On hydrochlorothiazide outpatient.  Hold due to FREDERICK.  Holm's esophagus  Following with GI.  Recently started on omeprazole around April.  Will hold omeprazole in case it is possibly related to #1.  Macrocytic anemia, thrombocytosis  Hemoglobin 12.5, MCV 94.9.  B12 and folate within normal limits on 7/2/2024.  Platelets 413, most recently 459 on 7/2/2024.  Repeat CBC tomorrow.    History of Present Illness:  Servando Saha is a 69 y.o. male with a history of nephrolithiasis and hypercalciuria who presented to [facility] with chief complaint of fatigue.  Over the past couple weeks, the patient has been having a multitude of symptoms.  He reports he has been going about his day-to-day activities which typically include doing some  Potassium (Less than 3000 mg/day)    Physical Exam:  BP (!) 155/81   Pulse 56   Temp 98.8 °F (37.1 °C) (Oral)   Resp 18   Wt 81.6 kg (180 lb)   SpO2 100%   BMI 26.01 kg/m²   General: Alert, in no acute distress, cooperative  HEENT:  Normocephalic and atraumatic.  No scleral icterus. External nares without deformity.  External ears normal. Mucous membranes are moist.  Neck: Trachea midline. No jugular venous distension.  Lungs: On room air. Respiratory rate and effort normal.  Lungs clear to auscultation bilaterally.  Cardiac: Regular rhythm and rate.  No murmur, rubs, or gallops.  Abdomen: Non-distended and soft. No tenderness to palpation in all 4 quadrants. No guarding or rigidity.  Bowel sounds normoactive.  Extremities:  No clubbing, cyanosis, or lower extremity edema.  Vasculature: capillary refill < 3 seconds.  Lower extremity pulses 2+ bilaterally  Skin:  Warm and dry on exposed surfaces.  Psychiatric: Mood normal.  Affect appropriate.  Neurologic: Alert and oriented x4.  No cranial nerve deficits.  No extremity weakness.    Data: (All radiographs, tracings, PFTs, and imaging are personally viewed and interpreted unless otherwise noted)  Labs:   Recent Labs     07/02/24  0937 07/03/24  0955   WBC 8.0 8.4   HGB 12.6* 12.5*   HCT 37.8* 39.2*   * 413*     Recent Labs     07/02/24  0937 07/03/24  0955    137   K 5.7* 5.9*    104   CO2 22 20*   BUN 57* 57*   CREATININE 7.28* 6.9*   CALCIUM 9.4 9.1   PHOS  --  4.9*     Recent Labs     07/02/24  0937 07/03/24  0955   AST 58* 40   ALT 56* 56   BILITOT 0.7 0.4   ALKPHOS 278* 283*     No results for input(s): \"INR\" in the last 72 hours.  Recent Labs     07/03/24  0955   CKTOTAL 63     Urinalysis:   Lab Results   Component Value Date/Time    NITRU Negative 04/10/2013 09:28 AM    WBCUA 0-2 09/11/2012 11:50 PM    BACTERIA NONE 09/11/2012 11:50 PM    RBCUA 25-50 09/11/2012 11:50 PM    BLOODU small 07/01/2024 03:20 PM    BLOODU Negative 04/10/2013

## 2024-07-03 NOTE — ED NOTES
Patient transported to [facility] in stable condition. Spoke to Alistair prior to arrival.

## 2024-07-03 NOTE — PROGRESS NOTES
Pt admitted to  6K22 in a wheelchair.   Complaints: abnormal labs.    Vital signs obtained. Assessment and data collection initiated. Two nurse skin assessment performed by Hilario STEVENS and Glory STEVENS. Oriented to room. Policies and procedures for 6K explained. Glory STEVENS discussed hourly rounding with patient addressing 5 P's. Fall prevention and safety brochure discussed with patient.  Bed alarm on. Call light in reach.

## 2024-07-03 NOTE — ED NOTES
ED to inpatient nurses report      Chief Complaint:  Chief Complaint   Patient presents with    Fatigue     Present to ED from: home    MOA:     LOC: alert and orientated to name, place, date  Mobility: Independent  Oxygen Baseline: RA RA      Current needs required: RA     Code Status:   No Order    What abnormal results were found and what did you give/do to treat them? LABS   Any procedures or intervention occur? NA    Mental Status:  Level of Consciousness: Alert (0)    Psych Assessment:        Vitals:  Patient Vitals for the past 24 hrs:   BP Temp Temp src Pulse Resp SpO2 Weight   07/03/24 1108 123/76 -- -- 66 20 98 % --   07/03/24 0954 (!) 161/84 97.8 °F (36.6 °C) Oral 76 22 99 % 81.6 kg (180 lb)        LDAs:      Ambulatory Status:  No data recorded    Diagnosis:  DISPOSITION Admitted 07/03/2024 11:42:36 AM   Final diagnoses:   None        Consults:  STR ED TO IP CONSULT     Pain Score:  Pain Assessment  Pain Assessment: 0-10  Pain Level: 0  Patient's Stated Pain Goal: 0 - No pain    C-SSRS:   Risk of Suicide: No Risk    Sepsis Screening:       [place] Fall Risk:       Swallow Screening        Preferred Language:   English      ALLERGIES     Patient has no known allergies.    SURGICAL HISTORY       Past Surgical History:   Procedure Laterality Date    ANTERIOR CERVICAL DISCECTOMY W/ FUSION  04/2021    Dr. Cruz, C4-6    CYSTOSCOPY  12/11/2012    WITH STONE EXTRACTION    HERNIA REPAIR Left 1967?    KIDNEY STONE REMOVAL      KNEE SURGERY Right 03/2013    Dr. Last at [facility]    KNEE SURGERY Left     knee scope       PAST MEDICAL HISTORY       Past Medical History:   Diagnosis Date    Choreoathetosis     Hyperlipidemia     Kidney stones     Renal lithiasis            Electronically signed by Lorraine Carreon RN on 7/3/2024 at 12:00 PM

## 2024-07-03 NOTE — PLAN OF CARE
Problem: Discharge Planning  Goal: Discharge to home or other facility with appropriate resources  Outcome: Progressing  Flowsheets (Taken 7/3/2024 1250 by Steph Torres, RN)  Discharge to home or other facility with appropriate resources:   Identify barriers to discharge with patient and caregiver   Identify discharge learning needs (meds, wound care, etc)   Refer to discharge planning if patient needs post-hospital services based on physician order or complex needs related to functional status, cognitive ability or social support system   Arrange for needed discharge resources and transportation as appropriate  Note: Patient plans to be discharged to home with wife when medically stable.      Problem: Pain  Goal: Verbalizes/displays adequate comfort level or baseline comfort level  Outcome: Progressing  Flowsheets (Taken 7/3/2024 1556)  Verbalizes/displays adequate comfort level or baseline comfort level: Assess pain using appropriate pain scale  Note: No complaint of pain voiced at this time.  Continue to monitor. PRN medications available if needed.      Problem: Safety - Adult  Goal: Free from fall injury  Outcome: Progressing    Care plan reviewed with patient and wife at the bedside. Patient verbalizes understanding of plan of care and contributes to goal setting.

## 2024-07-03 NOTE — ED PROVIDER NOTES
[facility] Renal Telemetry 6K      CHIEF COMPLAINT       Chief Complaint   Patient presents with    Fatigue       Nurses Notes reviewed and I agree except as noted in the HPI.      HISTORY OF PRESENT ILLNESS    Servando Saha is a 69 y.o. male who presents with complaint of fatigue, patient sent to the ED by his PCP due to worsening kidney function from lab work done 2 days ago.  Patient is on hydrochlorothiazide and takes potassium supplementation at home.  Otherwise he denies any chest pain or belly pain, no cough fever or chills.  Onset: Acute  Duration: Found out 2 days ago  Timing: Persistent  Location of Pain: None no pain complaints  Intesity/severity: Creatinine around 7  Modifying Factors: On diuretics and potassium supplementation  Relieved by;  Previous Episodes;  Tx Before arrival: None  PAST MEDICAL HISTORY    has a past medical history of Choreoathetosis, Hyperlipidemia, Kidney stones, and Renal lithiasis.    SURGICAL HISTORY      has a past surgical history that includes hernia repair (Left, 1967?); Cystocopy (12/11/2012); knee surgery (Right, 03/2013); knee surgery (Left); Kidney stone removal; and Anterior cervical discectomy w/ fusion (04/2021).    CURRENT MEDICATIONS       Current Discharge Medication List        CONTINUE these medications which have NOT CHANGED    Details   omeprazole (PRILOSEC) 40 MG delayed release capsule 1 capsule 30 minutes before morning meal Orally Once a day for 90 days      cefdinir (OMNICEF) 300 MG capsule Take 1 capsule by mouth 2 times daily for 10 days  Qty: 20 capsule, Refills: 0    Associated Diagnoses: Abnormal urinalysis      zoster recombinant adjuvanted vaccine (SHINGRIX) 50 MCG/0.5ML SUSR injection Inject 0.5 mLs into the muscle See Admin Instructions 1 dose now and repeat in 2-6 months  Qty: 0.5 mL, Refills: 0    Associated Diagnoses: Need for shingles vaccine      potassium citrate (UROCIT-K) 10 MEQ (1080 MG) extended release tablet Take 1 tablet by mouth 3 times

## 2024-07-03 NOTE — H&P
[facility]    [address] [place], [place] [address]  [phone]     Hospital Consult  7/3/2024 1:53 PM    Pt Name:    Servando Saha  MRN:     [id number]   [id number]  YOB: 1955  Admit Date:    7/3/2024  9:50 AM  Primary Care Physician:  Apurva Lozano, APRN - CNP        Reason for Consult:  FREDERICK    History:   The patient is a 69 y.o. male with a history of nephrolithiasis and hypercalciuria who presented with a chief complaint of fatigue. About 2 weeks ago he started to feel generalized body aches and chills at the end of the day and would take an over the counter aleve pill every night for the last two weeks. This past Saturday night he felt really unwell with body aches and fatigue so he went to his PCP where they did some labs and he was sent to the hospital yesterday because his Cr ws elevated at 7.28 and down at 6.9 with a baseline of 1.1-1.3. Patient has a GFR of 8. At the PCP office he was given cefdinir for an abnormal UA. Patient also mentions being started on omeprazole in April 2024 for Holm's esophagus. He also shares a history of nephrolithiasis that he takes HCT and potassium citrate for. The patient denies chest pain, SOB, headache and dizziness.     Past Medical History:  Past Medical History:   Diagnosis Date    Choreoathetosis     Hyperlipidemia     Kidney stones     Renal lithiasis        Past Surgical History:  Past Surgical History:   Procedure Laterality Date    ANTERIOR CERVICAL DISCECTOMY W/ FUSION  04/2021    Dr. Cruz, C4-6    CYSTOSCOPY  12/11/2012    WITH STONE EXTRACTION    HERNIA REPAIR Left 1967?    KIDNEY STONE REMOVAL      KNEE SURGERY Right 03/2013    Dr. Last at [facility]    KNEE SURGERY Left     knee scope       Family History:  Family History   Problem Relation Age of Onset    Coronary Art Dis Mother     Dementia Father     Prostate Cancer Neg Hx        Social History:  Social History     Socioeconomic History    Marital status:   no icteric sclera, no pallor conjunctiva, no discharge seen from either eye  Ears and Nose: normal external appearance of left and right ear and nose.  No active drainage from nose.   Oral: moist oral mucus membranes  Neck: No jugular venous distention, appears symmetric, good ROM  Lungs: Air entry B/L, no crackles or rales, no use of accessory muscles  Heart: regular rate, S1, S2  Extremities: * LE edema  GI: soft, non-tender, no guarding  Skin: no rash seen on exposed extremities  Musculo: moves all extremities  Neuro: no slurred speech, no facial drooping, no asterixis  Psychiatric: Awake, alert, Oriented    Lab Data  CBC:   Recent Labs     07/02/24  0937 07/03/24  0955   WBC 8.0 8.4   HGB 12.6* 12.5*   HCT 37.8* 39.2*   * 413*     BMP:  Recent Labs     07/02/24  0937 07/03/24  0955    137   K 5.7* 5.9*    104   CO2 22 20*   BUN 57* 57*   CREATININE 7.28* 6.9*   GLUCOSE 103* 134*   CALCIUM 9.4 9.1   MG  --  1.8     PTH: @PTH@  TSH:   Recent Labs     07/03/24  0955   TSH 0.790     HgBa1c: No results for input(s): \"LABA1C\" in the last 72 hours.  Hepatic:   Recent Labs     07/02/24  0937 07/03/24  0955   AST 58* 40   ALT 56* 56   BILITOT 0.7 0.4   ALKPHOS 278* 283*     ABGs: No results found for: \"PHART\", \"PO2ART\", \"ACT3CQN\"  Troponin: No results for input(s): \"TROPONINI\" in the last 72 hours.  BNP: No results for input(s): \"BNP\" in the last 72 hours.    Old labs reviewed.       Impression and Plan:  Acute kidney injury  Creatinine 6.9 , baseline around 1.1-1.3.  GFR 8 last GFR was in 2/1/23 at 72  Urinalysis with microscopy, urine eosinophils, renal ultrasound pending.  Patient started on normal saline at 150 mL/h.  Strict ins and outs.  Avoid nephrotoxins.  Hyperkalemia  Potassium 5.9.  Likely secondary to acute kidney injury.  No EKG changes.  Dose of Lokelma ordered.  Recheck potassium.  Abnormal urinalysis  Urinalysis on 7/1/2024 with 30 protein, small blood, small leukocytes.  Patient was

## 2024-07-04 PROBLEM — E87.5 HYPERKALEMIA: Status: ACTIVE | Noted: 2024-07-04

## 2024-07-04 PROBLEM — E87.20 METABOLIC ACIDOSIS: Status: ACTIVE | Noted: 2024-07-04

## 2024-07-04 LAB
ANION GAP SERPL CALC-SCNC: 10 MEQ/L (ref 8–16)
BASOPHILS ABSOLUTE: 0 THOU/MM3 (ref 0–0.1)
BASOPHILS NFR BLD AUTO: 0.6 %
BUN SERPL-MCNC: 56 MG/DL (ref 7–22)
CALCIUM SERPL-MCNC: 8 MG/DL (ref 8.5–10.5)
CHLORIDE SERPL-SCNC: 106 MEQ/L (ref 98–111)
CO2 SERPL-SCNC: 20 MEQ/L (ref 23–33)
CREAT SERPL-MCNC: 6.6 MG/DL (ref 0.4–1.2)
DEPRECATED RDW RBC AUTO: 44.4 FL (ref 35–45)
EOSINOPHIL NFR BLD AUTO: 3.8 %
EOSINOPHILS ABSOLUTE: 0.3 THOU/MM3 (ref 0–0.4)
ERYTHROCYTE [DISTWIDTH] IN BLOOD BY AUTOMATED COUNT: 12.6 % (ref 11.5–14.5)
GFR SERPL CREATININE-BSD FRML MDRD: 9 ML/MIN/1.73M2
GLUCOSE BLD STRIP.AUTO-MCNC: 117 MG/DL (ref 70–108)
GLUCOSE BLD STRIP.AUTO-MCNC: 91 MG/DL (ref 70–108)
GLUCOSE SERPL-MCNC: 104 MG/DL (ref 70–108)
HCT VFR BLD AUTO: 31.4 % (ref 42–52)
HGB BLD-MCNC: 10.1 GM/DL (ref 14–18)
IMM GRANULOCYTES # BLD AUTO: 0.01 THOU/MM3 (ref 0–0.07)
IMM GRANULOCYTES NFR BLD AUTO: 0.1 %
LYMPHOCYTES ABSOLUTE: 1.5 THOU/MM3 (ref 1–4.8)
LYMPHOCYTES NFR BLD AUTO: 18.7 %
MAGNESIUM SERPL-MCNC: 1.6 MG/DL (ref 1.6–2.4)
MCH RBC QN AUTO: 30.9 PG (ref 26–33)
MCHC RBC AUTO-ENTMCNC: 32.2 GM/DL (ref 32.2–35.5)
MCV RBC AUTO: 96 FL (ref 80–94)
MONOCYTES ABSOLUTE: 0.8 THOU/MM3 (ref 0.4–1.3)
MONOCYTES NFR BLD AUTO: 10.2 %
NEUTROPHILS ABSOLUTE: 5.2 THOU/MM3 (ref 1.8–7.7)
NEUTROPHILS NFR BLD AUTO: 66.6 %
NRBC BLD AUTO-RTO: 0 /100 WBC
PHOSPHATE SERPL-MCNC: 5.6 MG/DL (ref 2.4–4.7)
PLATELET # BLD AUTO: 316 THOU/MM3 (ref 130–400)
PMV BLD AUTO: 9.1 FL (ref 9.4–12.4)
POTASSIUM SERPL-SCNC: 5.4 MEQ/L (ref 3.5–5.2)
RBC # BLD AUTO: 3.27 MILL/MM3 (ref 4.7–6.1)
SODIUM SERPL-SCNC: 136 MEQ/L (ref 135–145)
WBC # BLD AUTO: 7.8 THOU/MM3 (ref 4.8–10.8)

## 2024-07-04 PROCEDURE — 99232 SBSQ HOSP IP/OBS MODERATE 35: CPT | Performed by: INTERNAL MEDICINE

## 2024-07-04 PROCEDURE — 1200000003 HC TELEMETRY R&B

## 2024-07-04 PROCEDURE — 2500000003 HC RX 250 WO HCPCS

## 2024-07-04 PROCEDURE — 2580000003 HC RX 258: Performed by: INTERNAL MEDICINE

## 2024-07-04 PROCEDURE — 80048 BASIC METABOLIC PNL TOTAL CA: CPT

## 2024-07-04 PROCEDURE — 85025 COMPLETE CBC W/AUTO DIFF WBC: CPT

## 2024-07-04 PROCEDURE — 83735 ASSAY OF MAGNESIUM: CPT

## 2024-07-04 PROCEDURE — 99232 SBSQ HOSP IP/OBS MODERATE 35: CPT

## 2024-07-04 PROCEDURE — 82948 REAGENT STRIP/BLOOD GLUCOSE: CPT

## 2024-07-04 PROCEDURE — 6370000000 HC RX 637 (ALT 250 FOR IP): Performed by: INTERNAL MEDICINE

## 2024-07-04 PROCEDURE — 84100 ASSAY OF PHOSPHORUS: CPT

## 2024-07-04 PROCEDURE — 36415 COLL VENOUS BLD VENIPUNCTURE: CPT

## 2024-07-04 RX ORDER — CALCIUM GLUCONATE 10 MG/ML
1000 INJECTION, SOLUTION INTRAVENOUS ONCE
Status: COMPLETED | OUTPATIENT
Start: 2024-07-04 | End: 2024-07-04

## 2024-07-04 RX ADMIN — SODIUM ZIRCONIUM CYCLOSILICATE 10 G: 10 POWDER, FOR SUSPENSION ORAL at 20:50

## 2024-07-04 RX ADMIN — SODIUM CHLORIDE: 9 INJECTION, SOLUTION INTRAVENOUS at 18:19

## 2024-07-04 RX ADMIN — SODIUM CHLORIDE: 9 INJECTION, SOLUTION INTRAVENOUS at 04:43

## 2024-07-04 RX ADMIN — SODIUM CHLORIDE: 9 INJECTION, SOLUTION INTRAVENOUS at 11:32

## 2024-07-04 RX ADMIN — CALCIUM GLUCONATE 1000 MG: 10 INJECTION, SOLUTION INTRAVENOUS at 03:40

## 2024-07-04 RX ADMIN — SODIUM ZIRCONIUM CYCLOSILICATE 10 G: 10 POWDER, FOR SUSPENSION ORAL at 14:15

## 2024-07-04 RX ADMIN — SODIUM ZIRCONIUM CYCLOSILICATE 10 G: 10 POWDER, FOR SUSPENSION ORAL at 09:01

## 2024-07-04 NOTE — PLAN OF CARE
Problem: Discharge Planning  Goal: Discharge to home or other facility with appropriate resources  Outcome: Progressing  Flowsheets (Taken 7/3/2024 1250 by Steph Torres, RN)  Discharge to home or other facility with appropriate resources:   Identify barriers to discharge with patient and caregiver   Identify discharge learning needs (meds, wound care, etc)   Refer to discharge planning if patient needs post-hospital services based on physician order or complex needs related to functional status, cognitive ability or social support system   Arrange for needed discharge resources and transportation as appropriate  Note: Patient plans to be discharged to home with wife when medically stable.      Problem: Pain  Goal: Verbalizes/displays adequate comfort level or baseline comfort level  Outcome: Progressing  Flowsheets (Taken 7/3/2024 2358 by Eneida Aquino, RN)  Verbalizes/displays adequate comfort level or baseline comfort level:   Encourage patient to monitor pain and request assistance   Administer analgesics based on type and severity of pain and evaluate response   Consider cultural and social influences on pain and pain management  Note: No complaint of pain voiced at this time.  Continue to monitor. PRN medications available if needed.      Problem: Safety - Adult  Goal: Free from fall injury  Outcome: Progressing  Flowsheets (Taken 7/3/2024 2358 by Eneida Aquino, RN)  Free From Fall Injury:   Based on caregiver fall risk screen, instruct family/caregiver to ask for assistance with transferring infant if caregiver noted to have fall risk factors   Instruct family/caregiver on patient safety  Note: No falls noted this shift. Patient ambulates independently without difficulty. Spouse at bedside. Bed kept in low position. Safe environment maintained. Bedside table & call light in reach. Uses call light appropriately when needing assistance.     Care plan reviewed with patient. Patient verbalizes understanding of

## 2024-07-04 NOTE — PROGRESS NOTES
Pt's heart rate is dropping into the high 30's occasionally. Vital signs are WNL, and pt denies any s/s. This nurse will notified Nando Rico NP.    No new orders,

## 2024-07-04 NOTE — PROGRESS NOTES
PCP where a urine was checked and was found to be mildly abnormal and the patient was started on antibiotics.  He denies any dysuria, frequency, or urgency.  He reports he is still urinating and that it seems as though his urine output actually might be increased from baseline.  He reports his urine is a pale yellow color and has not appeared concentrated.  His PCP johanny routine lab work and the patient was found to have an acute kidney injury.  He was sent to the emergency department for further evaluation.  The patient has a history of nephrolithiasis, but denies any other history of kidney disease.  He denies any flank pain or abdominal pain at this time.  As mentioned previously, the patient does endorse some NSAID use, but often never took more than 1-2 Aleve per day.  Other than the Omnicef which was recently started for the abnormal urinalysis, the patient denies any other recent antibiotics.  The only other new medication that the patient started was omeprazole which was started in April for a diagnosis of Holm's esophagus.  Patient denies any family history of renal disease or autoimmune disease.  The patient does not currently smoke, drink alcohol, or use recreational drugs.  He is very active at baseline. \"     Subjective (past 24 hours):          Diet: ADULT DIET; Regular; Low Potassium (Less than 3000 mg/day)  ROS: Pertinent positives as noted in HPI. All other systems reviewed and negative.    Past medical history, family history, social history and allergies reviewed again and is unchanged since admission.    Exam:  /68   Pulse 54   Temp 97.9 °F (36.6 °C) (Oral)   Resp 16   Wt 81.6 kg (180 lb)   SpO2 98%   BMI 26.01 kg/m²   General:   alert, no apparent distress  HEENT:  normocephalic and atraumatic.  No scleral icterus. PERR.  Neck: supple.  No JVD. No thyromegaly.  Lungs: clear to auscultation.  No retractions  Cardiac: RRR without murmur.  Abdomen: soft.  Nontender.  Bowel sounds  positive.  Extremities:  No clubbing, cyanosis, or edema x 4.    Vasculature: capillary refill < 3 seconds. Palpable LE pulses bilaterally.  Skin:  warm and dry.  Psych:  Alert and oriented x3.  Affect appropriate  Lymph:  No supraclavicular adenopathy.  Neurologic:  No focal deficit.  No seizures.      Data: (All radiographs, tracings, PFTs, and imaging are personally viewed and interpreted unless otherwise noted)  Labs:   Recent Labs     07/02/24 0937 07/03/24  0955 07/04/24  0333   WBC 8.0 8.4 7.8   HGB 12.6* 12.5* 10.1*   HCT 37.8* 39.2* 31.4*   * 413* 316     Recent Labs     07/02/24  0937 07/03/24  0955 07/03/24  1543 07/03/24 2113 07/04/24  0333    137  --   --  136   K 5.7* 5.9* 5.8* 5.8* 5.4*    104  --   --  106   CO2 22 20*  --   --  20*   BUN 57* 57*  --   --  56*   CREATININE 7.28* 6.9*  --   --  6.6*   CALCIUM 9.4 9.1  --   --  8.0*   PHOS  --  4.9*  --   --  5.6*     Recent Labs     07/02/24 0937 07/03/24  0955   AST 58* 40   ALT 56* 56   BILITOT 0.7 0.4   ALKPHOS 278* 283*     No results for input(s): \"INR\" in the last 72 hours.  Recent Labs     07/03/24  0955   CKTOTAL 63     Urinalysis:   Lab Results   Component Value Date/Time    NITRU NEGATIVE 07/03/2024 03:20 PM    WBCUA 50-75 07/03/2024 03:20 PM    BACTERIA NONE SEEN 07/03/2024 03:20 PM    RBCUA 0-2 07/03/2024 03:20 PM    BLOODU NEGATIVE 07/03/2024 03:20 PM    SPECGRAV 1.015 07/01/2024 03:20 PM    GLUCOSEU NEGATIVE 07/03/2024 03:20 PM     Urine culture: No results found for: \"LABURIN\"  Micro:   Blood culture #1: No results found for: \"BC\"  Blood culture #2:No results found for: \"BLOODCULT2\"  Organism:No results found for: \"ORG\"    No results found for: \"LABGRAM\"  MRSA culture only:No results found for: \"MRSAC\"  Respiratory culture: No results found for: \"CULTRESP\"  Aerobic and Anaerobic :  No results found for: \"LABAERO\"  No results found for: \"LABANAE\"    Radiology Reports:  US RENAL COMPLETE   Final Result   1.

## 2024-07-04 NOTE — PROGRESS NOTES
[facility]   Nephrology progress note  7/4/2024, 12:30 PM      Pt Name:    Servando Saha  MRN:     [id number]     YOB: 1955  Admit Date:    7/3/2024  9:50 AM    Chief Complaint: Nephrology following for FREDERICK/CKD.    Subjective:  Patient seen and examined  No chest pain or shortness of breath  Feels okay eating and drinking well  Since having decent urine output    Objective:  24HR INTAKE/OUTPUT:    Intake/Output Summary (Last 24 hours) at 7/4/2024 1230  Last data filed at 7/4/2024 0835  Gross per 24 hour   Intake 280 ml   Output --   Net 280 ml      Admission weight: 81.6 kg (180 lb)  Wt Readings from Last 3 Encounters:   07/03/24 81.6 kg (180 lb)   02/22/24 78.5 kg (173 lb)   02/12/24 83 kg (183 lb)        Vitals :   Vitals:    07/04/24 0000 07/04/24 0310 07/04/24 0900 07/04/24 1130   BP: (!) 105/58 113/63 123/68 136/68   Pulse: 56 56 53 54   Resp: 18 18 18 16   Temp: 99 °F (37.2 °C) 99 °F (37.2 °C) 97.7 °F (36.5 °C) 97.9 °F (36.6 °C)   TempSrc: Oral Oral Oral Oral   SpO2: 100% 100% 98% 98%   Weight:           Physical examination  General Appearance:  Well developed. No distress  Mouth/Throat:  Oral mucosa moist  Neck:  Supple, no JVD  Lungs:  Breath sounds: clear  Heart::  S1,S2 heard  Abdomen:  Soft, non - tender  Musculoskeletal:  Edema -no edema noted    Medications:  Infusion:    sodium chloride 150 mL/hr at 07/04/24 1132    sodium chloride      dextrose       Meds:    sodium chloride flush  5-40 mL IntraVENous 2 times per day    heparin (porcine)  5,000 Units SubCUTAneous 3 times per day    sodium zirconium cyclosilicate  10 g Oral TID       Lab Data :  CBC:   Recent Labs     07/02/24  0937 07/03/24  0955 07/04/24  0333   WBC 8.0 8.4 7.8   HGB 12.6* 12.5* 10.1*   HCT 37.8* 39.2* 31.4*   * 413* 316     CMP:  Recent Labs     07/02/24  0937 07/03/24  0955 07/03/24  1543 07/03/24  2113 07/04/24  0333    137  --   --  136   K 5.7* 5.9* 5.8* 5.8* 5.4*    104  --

## 2024-07-04 NOTE — PLAN OF CARE
Problem: Discharge Planning  Goal: Discharge to home or other facility with appropriate resources  7/3/2024 2358 by Eneida Aquino RN  Flowsheets (Taken 7/3/2024 1250 by Steph Torres RN)  Discharge to home or other facility with appropriate resources:   Identify barriers to discharge with patient and caregiver   Identify discharge learning needs (meds, wound care, etc)   Refer to discharge planning if patient needs post-hospital services based on physician order or complex needs related to functional status, cognitive ability or social support system   Arrange for needed discharge resources and transportation as appropriate  7/3/2024 1556 by Hilario Rangel RN  Outcome: Progressing  Flowsheets (Taken 7/3/2024 1250 by Steph Torres, RN)  Discharge to home or other facility with appropriate resources:   Identify barriers to discharge with patient and caregiver   Identify discharge learning needs (meds, wound care, etc)   Refer to discharge planning if patient needs post-hospital services based on physician order or complex needs related to functional status, cognitive ability or social support system   Arrange for needed discharge resources and transportation as appropriate  Note: Patient plans to be discharged to home with wife when medically stable.      Problem: Pain  Goal: Verbalizes/displays adequate comfort level or baseline comfort level  7/3/2024 2358 by Eneida Aquino RN  Flowsheets (Taken 7/3/2024 2358)  Verbalizes/displays adequate comfort level or baseline comfort level:   Encourage patient to monitor pain and request assistance   Administer analgesics based on type and severity of pain and evaluate response   Consider cultural and social influences on pain and pain management  7/3/2024 1556 by Hilario Rangel RN  Outcome: Progressing  Flowsheets (Taken 7/3/2024 1556)  Verbalizes/displays adequate comfort level or baseline comfort level: Assess pain using appropriate pain scale  Note: No complaint of

## 2024-07-05 PROBLEM — N28.1 RENAL CYST: Status: ACTIVE | Noted: 2024-07-05

## 2024-07-05 PROBLEM — I10 PRIMARY HYPERTENSION: Status: ACTIVE | Noted: 2024-07-05

## 2024-07-05 LAB
ANION GAP SERPL CALC-SCNC: 11 MEQ/L (ref 8–16)
BACTERIA UR CULT: NORMAL
BUN SERPL-MCNC: 54 MG/DL (ref 7–22)
CALCIUM SERPL-MCNC: 8.3 MG/DL (ref 8.5–10.5)
CHLORIDE SERPL-SCNC: 111 MEQ/L (ref 98–111)
CO2 SERPL-SCNC: 20 MEQ/L (ref 23–33)
CREAT SERPL-MCNC: 5.9 MG/DL (ref 0.4–1.2)
DEPRECATED RDW RBC AUTO: 44.4 FL (ref 35–45)
ERYTHROCYTE [DISTWIDTH] IN BLOOD BY AUTOMATED COUNT: 12.7 % (ref 11.5–14.5)
GFR SERPL CREATININE-BSD FRML MDRD: 10 ML/MIN/1.73M2
GLUCOSE SERPL-MCNC: 91 MG/DL (ref 70–108)
HCT VFR BLD AUTO: 31.9 % (ref 42–52)
HGB BLD-MCNC: 10.2 GM/DL (ref 14–18)
MAGNESIUM SERPL-MCNC: 1.6 MG/DL (ref 1.6–2.4)
MCH RBC QN AUTO: 30.5 PG (ref 26–33)
MCHC RBC AUTO-ENTMCNC: 32 GM/DL (ref 32.2–35.5)
MCV RBC AUTO: 95.5 FL (ref 80–94)
PHOSPHATE SERPL-MCNC: 4.9 MG/DL (ref 2.4–4.7)
PLATELET # BLD AUTO: 321 THOU/MM3 (ref 130–400)
PMV BLD AUTO: 9.2 FL (ref 9.4–12.4)
POTASSIUM SERPL-SCNC: 4.6 MEQ/L (ref 3.5–5.2)
RBC # BLD AUTO: 3.34 MILL/MM3 (ref 4.7–6.1)
SODIUM SERPL-SCNC: 142 MEQ/L (ref 135–145)
WBC # BLD AUTO: 7.7 THOU/MM3 (ref 4.8–10.8)

## 2024-07-05 PROCEDURE — 99232 SBSQ HOSP IP/OBS MODERATE 35: CPT | Performed by: INTERNAL MEDICINE

## 2024-07-05 PROCEDURE — 83735 ASSAY OF MAGNESIUM: CPT

## 2024-07-05 PROCEDURE — 1200000000 HC SEMI PRIVATE

## 2024-07-05 PROCEDURE — 84100 ASSAY OF PHOSPHORUS: CPT

## 2024-07-05 PROCEDURE — 85027 COMPLETE CBC AUTOMATED: CPT

## 2024-07-05 PROCEDURE — 6360000002 HC RX W HCPCS: Performed by: INTERNAL MEDICINE

## 2024-07-05 PROCEDURE — 36415 COLL VENOUS BLD VENIPUNCTURE: CPT

## 2024-07-05 PROCEDURE — 80048 BASIC METABOLIC PNL TOTAL CA: CPT

## 2024-07-05 PROCEDURE — 2580000003 HC RX 258: Performed by: INTERNAL MEDICINE

## 2024-07-05 PROCEDURE — 99232 SBSQ HOSP IP/OBS MODERATE 35: CPT

## 2024-07-05 RX ORDER — HYDRALAZINE HYDROCHLORIDE 20 MG/ML
10 INJECTION INTRAMUSCULAR; INTRAVENOUS EVERY 6 HOURS PRN
Status: DISCONTINUED | OUTPATIENT
Start: 2024-07-05 | End: 2024-07-08 | Stop reason: HOSPADM

## 2024-07-05 RX ADMIN — SODIUM CHLORIDE: 9 INJECTION, SOLUTION INTRAVENOUS at 09:00

## 2024-07-05 RX ADMIN — METHYLPREDNISOLONE SODIUM SUCCINATE 500 MG: 500 INJECTION INTRAMUSCULAR; INTRAVENOUS at 12:32

## 2024-07-05 RX ADMIN — SODIUM CHLORIDE: 9 INJECTION, SOLUTION INTRAVENOUS at 02:02

## 2024-07-05 NOTE — PROGRESS NOTES
Pt was in bed as his family was with him. He was dealing with acute kidney injury. He was encouraged and anointed.    07/05/24 7704   Encounter Summary   Service Provided For Patient and family together   Referral/Consult From Rounding   Support System Spouse;Children   Last Encounter  07/05/24  (Anointed)   Complexity of Encounter Low   Spiritual/Emotional needs   Type Spiritual Support   Rituals, Rites and Sacraments   Type Anointing   Assessment/Intervention/Outcome   Assessment Hopeful   Intervention Empowerment   Outcome Encouraged

## 2024-07-05 NOTE — PROGRESS NOTES
without any focal sensory/motor deficits. Cranial nerves: II-XII intact, grossly non-focal.  Psychiatric: Alert and oriented, thought content appropriate, normal insight  Capillary Refill: Brisk,< 3 seconds   Peripheral Pulses: +2 palpable, equal bilaterally       Labs:   Recent Labs     07/03/24  0955 07/04/24  0333 07/05/24  0553   WBC 8.4 7.8 7.7   HGB 12.5* 10.1* 10.2*   HCT 39.2* 31.4* 31.9*   * 316 321     Recent Labs     07/03/24  0955 07/03/24  1543 07/03/24  2113 07/04/24  0333 07/05/24  0553     --   --  136 142   K 5.9*   < > 5.8* 5.4* 4.6     --   --  106 111   CO2 20*  --   --  20* 20*   BUN 57*  --   --  56* 54*   CREATININE 6.9*  --   --  6.6* 5.9*   CALCIUM 9.1  --   --  8.0* 8.3*   PHOS 4.9*  --   --  5.6* 4.9*    < > = values in this interval not displayed.     Recent Labs     07/03/24  0955   AST 40   ALT 56   BILITOT 0.4   ALKPHOS 283*     No results for input(s): \"INR\" in the last 72 hours.  Recent Labs     07/03/24  0955   CKTOTAL 63       Microbiology:      Urinalysis:      Lab Results   Component Value Date/Time    NITRU NEGATIVE 07/03/2024 03:20 PM    WBCUA 50-75 07/03/2024 03:20 PM    BACTERIA NONE SEEN 07/03/2024 03:20 PM    RBCUA 0-2 07/03/2024 03:20 PM    BLOODU NEGATIVE 07/03/2024 03:20 PM    SPECGRAV 1.015 07/01/2024 03:20 PM    GLUCOSEU NEGATIVE 07/03/2024 03:20 PM       Radiology:  US RENAL COMPLETE   Final Result   1. Right-sided pelviectasis.   2. Probable left renal cyst.            **This report has been created using voice recognition software. It may contain   minor errors which are inherent in voice recognition technology.**         Electronically signed by Dr. Patrick Robles          DVT prophylaxis: [] Lovenox                                 [x] SCDs                                 [] SQ Heparin                                 [] Encourage ambulation           [] Already on Anticoagulation     Code Status: Full Code    PT/OT Eval Status:     Tele:   [x]  yes             [] no    Electronically signed by Carlos Jain DO on 7/5/2024 at 6:14 PM

## 2024-07-05 NOTE — PROGRESS NOTES
This  attempted to visit with Mar, a 69 year old male admitted to 6K 22 at [facility]. At the time of this encounter, patient is propped up in his bed, awake and oriented. Patient's wife, daughter and several grand children are also in the room.visiting patient. The purpose of this visit is to provide spiritual and emotional support to patient, talk about ACP document and assist patient in completing document if patient if patient is ready.     During this encounter, I explained to patient the two categories of the document, shared the four ACP questions on CPR, Chest Compression, resuscitation and, and intubation. Patient stated he and is wife need little more time to review these terminologies to help him make the best decision. Patient shared they will be ready to call on Saturday or before patient is discharge to make decision on Decision maker and code status.    I offered encouragement to patient and nurtured hope. Spiritual Health staff will continue to provide emotional and spiritual support to patient and family as needed.

## 2024-07-05 NOTE — PROGRESS NOTES
[facility]   Nephrology progress note  7/5/2024, 12:38 PM      Pt Name:    Servando Saha  MRN:     [id number]     YOB: 1955  Admit Date:    7/3/2024  9:50 AM    Chief Complaint: Nephrology following for FREDERICK/CKD.    Subjective:  Patient seen and examined  Feels okay no complaints.    Objective:  24HR INTAKE/OUTPUT:    Intake/Output Summary (Last 24 hours) at 7/5/2024 1238  Last data filed at 7/5/2024 0921  Gross per 24 hour   Intake 360 ml   Output --   Net 360 ml      Admission weight: 81.6 kg (180 lb)  Wt Readings from Last 3 Encounters:   07/04/24 81.6 kg (180 lb)   02/22/24 78.5 kg (173 lb)   02/12/24 83 kg (183 lb)        Vitals :   Vitals:    07/04/24 2311 07/05/24 0317 07/05/24 0800 07/05/24 1105   BP: (!) 140/72 138/76 136/87 133/82   Pulse: 54 62 52 56   Resp: 18 18 16 16   Temp: 98.2 °F (36.8 °C) 98.2 °F (36.8 °C) 98.1 °F (36.7 °C) 97.7 °F (36.5 °C)   TempSrc: Oral Oral Oral Oral   SpO2: 96% 99% 99% 100%   Weight:       Height:           Physical examination  General Appearance:  Well developed. No distress  Mouth/Throat:  Oral mucosa moist  Neck:  Supple, no JVD  Lungs:  Breath sounds: clear  Heart::  S1,S2 heard  Abdomen:  Soft, non - tender  Musculoskeletal:  Edema -no edema noted    Medications:  Infusion:    sodium chloride 100 mL/hr at 07/05/24 1142    sodium chloride      dextrose       Meds:    methylPREDNISolone  500 mg IntraVENous Daily    sodium chloride flush  5-40 mL IntraVENous 2 times per day    heparin (porcine)  5,000 Units SubCUTAneous 3 times per day       Lab Data :  CBC:   Recent Labs     07/03/24  0955 07/04/24  0333 07/05/24  0553   WBC 8.4 7.8 7.7   HGB 12.5* 10.1* 10.2*   HCT 39.2* 31.4* 31.9*   * 316 321     CMP:  Recent Labs     07/03/24  0955 07/03/24  1543 07/03/24  2113 07/04/24  0333 07/05/24  0553     --   --  136 142   K 5.9*   < > 5.8* 5.4* 4.6     --   --  106 111   CO2 20*  --   --  20* 20*   BUN 57*  --   --  56* 54*

## 2024-07-05 NOTE — CARE COORDINATION
Case Management Assessment Initial Evaluation    Date/Time of Evaluation: 2024 8:20 AM  Assessment Completed by: Viky Robles RN    If patient is discharged prior to next notation, then this note serves as note for discharge by case management.    Patient Name: Servando Saha                   YOB: 1955  Diagnosis: Acute kidney injury (HCC) [N17.9]                   Date / Time: 7/3/2024  9:50 AM  Location: [address] [place] [address]     Patient Admission Status: Inpatient   Readmission Risk Low 0-14, Mod 15-19), High > 20: Readmission Risk Score: 12.1    Current PCP: Apurva Lozano, APRN - CNP    Additional Case Management Notes: To ED with fatigue. Patient went to PCP for multiple symptoms, got routine labs and was found to have an FREDERICK. Hospitalist and Nephrology following. Telemetry. IVF@100. Start IVPB solu-medrol. Creat 5.9 (6.6) Hgb 10.2 (10.1) BMP in AM.     Procedures: none    Imagin/3  Renal: Right-sided pelviectasis. 2. Probable left renal cyst    Patient Goals/Plan/Treatment Preferences: Met w/ Servando and family. Verified PCP and insurance. He is independent and actively drives. Denies needs for DME, HH or OP services. Plans to return home w/ wife.      24 1330   Service Assessment   Patient Orientation Alert and Oriented   Cognition Alert   History Provided By Patient   Primary Caregiver Self   Accompanied By/Relationship wife, daughter, son-in-law, grandchildren   Support Systems Spouse/Significant Other;Children;Family Members;Friends/Neighbors   Patient's Healthcare Decision Maker is: Legal Next of Kin  (consult)   PCP Verified by CM Yes   Last Visit to PCP Within last 3 months   Prior Functional Level Independent in ADLs/IADLs   Current Functional Level Independent in ADLs/IADLs   Can patient return to prior living arrangement Yes   Ability to make needs known: Good   Family able to assist with home care needs: Yes   Would you like for me to discuss the discharge plan with any

## 2024-07-05 NOTE — PLAN OF CARE
Problem: Discharge Planning  Goal: Discharge to home or other facility with appropriate resources  7/4/2024 2129 by Cecile Russo RN  Outcome: Progressing  Flowsheets (Taken 7/4/2024 2129)  Discharge to home or other facility with appropriate resources:   Identify barriers to discharge with patient and caregiver   Arrange for needed discharge resources and transportation as appropriate   Identify discharge learning needs (meds, wound care, etc)     Problem: Pain  Goal: Verbalizes/displays adequate comfort level or baseline comfort level  7/4/2024 2129 by Cecile Russo RN  Outcome: Progressing  Flowsheets (Taken 7/4/2024 2129)  Verbalizes/displays adequate comfort level or baseline comfort level:   Encourage patient to monitor pain and request assistance   Assess pain using appropriate pain scale   Administer analgesics based on type and severity of pain and evaluate response   Implement non-pharmacological measures as appropriate and evaluate response     Problem: Safety - Adult  Goal: Free from fall injury  7/4/2024 2129 by Cecile Russo RN  Outcome: Progressing  Flowsheets (Taken 7/4/2024 2129)  Free From Fall Injury: Instruct family/caregiver on patient safety     Problem: Cardiovascular - Adult  Goal: Maintains optimal cardiac output and hemodynamic stability  Outcome: Progressing  Flowsheets (Taken 7/4/2024 2129)  Maintains optimal cardiac output and hemodynamic stability:   Monitor blood pressure and heart rate   Assess for signs of decreased cardiac output     Problem: Skin/Tissue Integrity - Adult  Goal: Skin integrity remains intact  Outcome: Progressing  Flowsheets (Taken 7/4/2024 2129)  Skin Integrity Remains Intact: Monitor for areas of redness and/or skin breakdown     Problem: Metabolic/Fluid and Electrolytes - Adult  Goal: Electrolytes maintained within normal limits  Outcome: Progressing  Flowsheets (Taken 7/4/2024 2129)  Electrolytes maintained within normal limits:   Monitor labs and assess  patient for signs and symptoms of electrolyte imbalances   Administer electrolyte replacement as ordered   Monitor response to electrolyte replacements, including repeat lab results as appropriate     Problem: Metabolic/Fluid and Electrolytes - Adult  Goal: Hemodynamic stability and optimal renal function maintained  Outcome: Progressing  Flowsheets (Taken 7/4/2024 2129)  Hemodynamic stability and optimal renal function maintained:   Monitor labs and assess for signs and symptoms of volume excess or deficit   Monitor intake, output and patient weight   Encourage oral intake as appropriate     Care plan reviewed with patient.  Patient verbalizes understanding of the care plan and contributed to goal setting.

## 2024-07-06 PROBLEM — E83.42 HYPOMAGNESEMIA: Status: ACTIVE | Noted: 2024-07-06

## 2024-07-06 LAB
ANION GAP SERPL CALC-SCNC: 14 MEQ/L (ref 8–16)
BUN SERPL-MCNC: 55 MG/DL (ref 7–22)
CALCIUM SERPL-MCNC: 8.7 MG/DL (ref 8.5–10.5)
CHLORIDE SERPL-SCNC: 108 MEQ/L (ref 98–111)
CO2 SERPL-SCNC: 17 MEQ/L (ref 23–33)
CREAT SERPL-MCNC: 4.9 MG/DL (ref 0.4–1.2)
GFR SERPL CREATININE-BSD FRML MDRD: 12 ML/MIN/1.73M2
GLUCOSE SERPL-MCNC: 148 MG/DL (ref 70–108)
MAGNESIUM SERPL-MCNC: 1.5 MG/DL (ref 1.6–2.4)
PHOSPHATE SERPL-MCNC: 4.7 MG/DL (ref 2.4–4.7)
POTASSIUM SERPL-SCNC: 3.9 MEQ/L (ref 3.5–5.2)
SODIUM SERPL-SCNC: 139 MEQ/L (ref 135–145)

## 2024-07-06 PROCEDURE — 6360000002 HC RX W HCPCS

## 2024-07-06 PROCEDURE — 6360000002 HC RX W HCPCS: Performed by: INTERNAL MEDICINE

## 2024-07-06 PROCEDURE — 84100 ASSAY OF PHOSPHORUS: CPT

## 2024-07-06 PROCEDURE — 80048 BASIC METABOLIC PNL TOTAL CA: CPT

## 2024-07-06 PROCEDURE — 36415 COLL VENOUS BLD VENIPUNCTURE: CPT

## 2024-07-06 PROCEDURE — 1200000000 HC SEMI PRIVATE

## 2024-07-06 PROCEDURE — 2580000003 HC RX 258: Performed by: INTERNAL MEDICINE

## 2024-07-06 PROCEDURE — 2580000003 HC RX 258: Performed by: PHYSICIAN ASSISTANT

## 2024-07-06 PROCEDURE — 99232 SBSQ HOSP IP/OBS MODERATE 35: CPT

## 2024-07-06 PROCEDURE — 83735 ASSAY OF MAGNESIUM: CPT

## 2024-07-06 PROCEDURE — 99233 SBSQ HOSP IP/OBS HIGH 50: CPT | Performed by: INTERNAL MEDICINE

## 2024-07-06 RX ORDER — MAGNESIUM SULFATE IN WATER 40 MG/ML
2000 INJECTION, SOLUTION INTRAVENOUS ONCE
Status: COMPLETED | OUTPATIENT
Start: 2024-07-06 | End: 2024-07-06

## 2024-07-06 RX ADMIN — SODIUM CHLORIDE: 9 INJECTION, SOLUTION INTRAVENOUS at 10:54

## 2024-07-06 RX ADMIN — MAGNESIUM SULFATE HEPTAHYDRATE 2000 MG: 40 INJECTION, SOLUTION INTRAVENOUS at 10:50

## 2024-07-06 RX ADMIN — SODIUM CHLORIDE, PRESERVATIVE FREE 10 ML: 5 INJECTION INTRAVENOUS at 08:32

## 2024-07-06 RX ADMIN — SODIUM CHLORIDE: 9 INJECTION, SOLUTION INTRAVENOUS at 00:42

## 2024-07-06 RX ADMIN — METHYLPREDNISOLONE SODIUM SUCCINATE 500 MG: 500 INJECTION INTRAMUSCULAR; INTRAVENOUS at 08:31

## 2024-07-06 RX ADMIN — SODIUM CHLORIDE: 9 INJECTION, SOLUTION INTRAVENOUS at 19:43

## 2024-07-06 NOTE — PROGRESS NOTES
[facility]   Nephrology progress note  7/6/2024, 11:01 AM      Pt Name:    Servando Saha  MRN:     [id number]     YOB: 1955  Admit Date:    7/3/2024  9:50 AM    Chief Complaint: Nephrology following for FREDERICK/CKD.    Subjective:  Patient seen and examined  No chest pain or shortness of breath  Feels okay eating and drinking well  Since having decent urine output    Objective:  24HR INTAKE/OUTPUT:    Intake/Output Summary (Last 24 hours) at 7/6/2024 1101  Last data filed at 7/6/2024 0646  Gross per 24 hour   Intake 1080 ml   Output --   Net 1080 ml        Admission weight: 81.6 kg (180 lb)  Wt Readings from Last 3 Encounters:   07/06/24 83.1 kg (183 lb 4.8 oz)   02/22/24 78.5 kg (173 lb)   02/12/24 83 kg (183 lb)        Vitals :   Vitals:    07/06/24 0006 07/06/24 0317 07/06/24 0545 07/06/24 0815   BP: 125/79 116/72  125/66   Pulse: 53 (!) 44  50   Resp: 15 15  20   Temp: 97.5 °F (36.4 °C) 97.7 °F (36.5 °C)  97.4 °F (36.3 °C)   TempSrc: Oral Oral  Oral   SpO2: 100% 99%  96%   Weight:  87.7 kg (193 lb 5.5 oz) 83.1 kg (183 lb 4.8 oz)    Height:           Physical examination  General Appearance:  Well developed. No distress  Mouth/Throat:  Oral mucosa moist  Neck:  Supple, no JVD  Lungs:  Breath sounds: clear  Heart::  S1,S2 heard  Abdomen:  Soft, non - tender  Musculoskeletal:  Edema -no edema noted    Medications:  Infusion:    sodium chloride 100 mL/hr at 07/06/24 1054    sodium chloride      dextrose       Meds:    magnesium sulfate  2,000 mg IntraVENous Once    methylPREDNISolone  500 mg IntraVENous Daily    sodium chloride flush  5-40 mL IntraVENous 2 times per day    heparin (porcine)  5,000 Units SubCUTAneous 3 times per day       Lab Data :  CBC:   Recent Labs     07/04/24  0333 07/05/24  0553   WBC 7.8 7.7   HGB 10.1* 10.2*   HCT 31.4* 31.9*    321       CMP:  Recent Labs     07/04/24  0333 07/05/24  0553 07/06/24  0804    142 139   K 5.4* 4.6 3.9    111 108    CO2 20* 20* 17*   BUN 56* 54* 55*   CREATININE 6.6* 5.9* 4.9*   GLUCOSE 104 91 148*   CALCIUM 8.0* 8.3* 8.7   MG 1.6 1.6 1.5*   PHOS 5.6* 4.9* 4.7       Hepatic:   No results for input(s): \"LABALBU\", \"AST\", \"ALT\", \"BILITOT\", \"ALKPHOS\" in the last 72 hours.    Invalid input(s): \"ALB\"      Assessment and Plan:  Renal -acute kidney injury, etiology thought to be due to volume depletion combined with the use of diuretic  Getting a lot of IV fluids creatinine somewhat getting better down to 4.9  However concerns for AIN due to PPI, urine eosinophils positive as well  Continue IV fluids currently on IV steroids as well    Hyperkalemia much improved  Essential hypertension  History of kidney stones  Metabolic acidosis stable  Meds reviewed and discussed with patient    Eliud Moody MD  [facility]    This report has been created using voice recognition software. It may contain minor errors which are inherent in voice recognition technology

## 2024-07-06 NOTE — PLAN OF CARE
Problem: Discharge Planning  Goal: Discharge to home or other facility with appropriate resources  Outcome: Progressing  Flowsheets (Taken 7/5/2024 2137)  Discharge to home or other facility with appropriate resources:   Identify barriers to discharge with patient and caregiver   Arrange for needed discharge resources and transportation as appropriate   Identify discharge learning needs (meds, wound care, etc)     Problem: Pain  Goal: Verbalizes/displays adequate comfort level or baseline comfort level  Outcome: Progressing  Flowsheets (Taken 7/5/2024 2137)  Verbalizes/displays adequate comfort level or baseline comfort level:   Encourage patient to monitor pain and request assistance   Assess pain using appropriate pain scale   Administer analgesics based on type and severity of pain and evaluate response   Implement non-pharmacological measures as appropriate and evaluate response     Problem: Safety - Adult  Goal: Free from fall injury  Outcome: Progressing  Flowsheets (Taken 7/5/2024 2137)  Free From Fall Injury:   Instruct family/caregiver on patient safety   Based on caregiver fall risk screen, instruct family/caregiver to ask for assistance with transferring infant if caregiver noted to have fall risk factors     Problem: Cardiovascular - Adult  Goal: Maintains optimal cardiac output and hemodynamic stability  Outcome: Progressing  Flowsheets (Taken 7/5/2024 2137)  Maintains optimal cardiac output and hemodynamic stability:   Monitor blood pressure and heart rate   Assess for signs of decreased cardiac output     Problem: Skin/Tissue Integrity - Adult  Goal: Skin integrity remains intact  Outcome: Progressing  Flowsheets (Taken 7/5/2024 2137)  Skin Integrity Remains Intact: Monitor for areas of redness and/or skin breakdown     Problem: Metabolic/Fluid and Electrolytes - Adult  Goal: Electrolytes maintained within normal limits  Outcome: Progressing  Flowsheets (Taken 7/5/2024 2137)  Electrolytes maintained

## 2024-07-06 NOTE — PLAN OF CARE
Problem: Discharge Planning  Goal: Discharge to home or other facility with appropriate resources  Outcome: Progressing  Flowsheets (Taken 7/6/2024 0815)  Discharge to home or other facility with appropriate resources: Identify barriers to discharge with patient and caregiver     Problem: Pain  Goal: Verbalizes/displays adequate comfort level or baseline comfort level  Outcome: Progressing  Flowsheets (Taken 7/6/2024 0815)  Verbalizes/displays adequate comfort level or baseline comfort level: Encourage patient to monitor pain and request assistance     Problem: Safety - Adult  Goal: Free from fall injury  Outcome: Progressing  Flowsheets (Taken 7/5/2024 2137 by Cecile Russo, RN)  Free From Fall Injury:   Instruct family/caregiver on patient safety   Based on caregiver fall risk screen, instruct family/caregiver to ask for assistance with transferring infant if caregiver noted to have fall risk factors     Problem: Cardiovascular - Adult  Goal: Maintains optimal cardiac output and hemodynamic stability  Outcome: Progressing  Flowsheets (Taken 7/6/2024 0815)  Maintains optimal cardiac output and hemodynamic stability: Monitor blood pressure and heart rate     Problem: Skin/Tissue Integrity - Adult  Goal: Skin integrity remains intact  Outcome: Progressing  Flowsheets (Taken 7/6/2024 0815)  Skin Integrity Remains Intact: Monitor for areas of redness and/or skin breakdown     Problem: Metabolic/Fluid and Electrolytes - Adult  Goal: Electrolytes maintained within normal limits  Outcome: Progressing  Flowsheets (Taken 7/6/2024 0815)  Electrolytes maintained within normal limits: Monitor labs and assess patient for signs and symptoms of electrolyte imbalances  Goal: Hemodynamic stability and optimal renal function maintained  Outcome: Progressing  Flowsheets (Taken 7/6/2024 0815)  Hemodynamic stability and optimal renal function maintained: Monitor labs and assess for signs and symptoms of volume excess or deficit

## 2024-07-06 NOTE — PROGRESS NOTES
but for about a 7 to 10-day. Starting about 2 weeks ago, the patient would have muscle aches, malaise, and chills in the evening. He would take an Aleve and typically feel better after this. Eventually, the symptoms subsided, but then the patient began experiencing diarrhea. This lasted for about 4 days and resolved a couple days ago. The patient reports about 4-6 stools per day, but did not have a significant amount of diarrhea. He has not had any vomiting. During this time, the patient denies any associated fevers, cough, shortness of breath, sore throat, or any rashes. He tested negative for COVID-19. He has been drinking plenty of fluids, but did have a decreased appetite. He went to his PCP where a urine was checked and was found to be mildly abnormal and the patient was started on antibiotics. He denies any dysuria, frequency, or urgency. He reports he is still urinating and that it seems as though his urine output actually might be increased from baseline. He reports his urine is a pale yellow color and has not appeared concentrated. His PCP johanny routine lab work and the patient was found to have an acute kidney injury. He was sent to the emergency department for further evaluation. The patient has a history of nephrolithiasis, but denies any other history of kidney disease. He denies any flank pain or abdominal pain at this time. As mentioned previously, the patient does endorse some NSAID use, but often never took more than 1-2 Aleve per day. Other than the Omnicef which was recently started for the abnormal urinalysis, the patient denies any other recent antibiotics. The only other new medication that the patient started was omeprazole which was started in April for a diagnosis of Holm's esophagus. Patient denies any family history of renal disease or autoimmune disease. The patient does not currently smoke, drink alcohol, or use recreational drugs. He is very active at baseline.     7/5/2024 patient  motion. No jugular venous distention. Trachea midline.  Respiratory:  Normal respiratory effort. Clear to auscultation, bilaterally without Rales/Wheezes/Rhonchi.  Cardiovascular: Regular rate and rhythm with normal S1/S2 without murmurs, rubs or gallops.  Abdomen: Soft, non-tender, non-distended with normal bowel sounds.  Musculoskeletal: passive and active ROM x 4 extremities.  Skin: Skin color, texture, turgor normal.  No rashes or lesions.  Neurologic:  Neurovascularly intact without any focal sensory/motor deficits. Cranial nerves: II-XII intact, grossly non-focal.  Psychiatric: Alert and oriented, thought content appropriate, normal insight  Capillary Refill: Brisk,< 3 seconds   Peripheral Pulses: +2 palpable, equal bilaterally       Labs:   Recent Labs     07/04/24 0333 07/05/24  0553   WBC 7.8 7.7   HGB 10.1* 10.2*   HCT 31.4* 31.9*    321       Recent Labs     07/04/24 0333 07/05/24  0553 07/06/24  0804    142 139   K 5.4* 4.6 3.9    111 108   CO2 20* 20* 17*   BUN 56* 54* 55*   CREATININE 6.6* 5.9* 4.9*   CALCIUM 8.0* 8.3* 8.7   PHOS 5.6* 4.9* 4.7       No results for input(s): \"AST\", \"ALT\", \"BILIDIR\", \"BILITOT\", \"ALKPHOS\" in the last 72 hours.    No results for input(s): \"INR\" in the last 72 hours.  No results for input(s): \"CKTOTAL\", \"TROPONINI\" in the last 72 hours.      Microbiology:      Urinalysis:      Lab Results   Component Value Date/Time    NITRU NEGATIVE 07/03/2024 03:20 PM    WBCUA 50-75 07/03/2024 03:20 PM    BACTERIA NONE SEEN 07/03/2024 03:20 PM    RBCUA 0-2 07/03/2024 03:20 PM    BLOODU NEGATIVE 07/03/2024 03:20 PM    SPECGRAV 1.015 07/01/2024 03:20 PM    GLUCOSEU NEGATIVE 07/03/2024 03:20 PM       Radiology:  US RENAL COMPLETE   Final Result   1. Right-sided pelviectasis.   2. Probable left renal cyst.            **This report has been created using voice recognition software. It may contain   minor errors which are inherent in voice recognition technology.**

## 2024-07-07 PROBLEM — R74.01 ELEVATION OF LEVELS OF LIVER TRANSAMINASE LEVELS: Status: ACTIVE | Noted: 2024-07-07

## 2024-07-07 PROBLEM — E88.09 HYPOALBUMINEMIA: Status: ACTIVE | Noted: 2024-07-07

## 2024-07-07 LAB
ALBUMIN SERPL BCG-MCNC: 3.2 G/DL (ref 3.5–5.1)
ALP SERPL-CCNC: 215 U/L (ref 38–126)
ALT SERPL W/O P-5'-P-CCNC: 106 U/L (ref 11–66)
ANION GAP SERPL CALC-SCNC: 12 MEQ/L (ref 8–16)
AST SERPL-CCNC: 101 U/L (ref 5–40)
BILIRUB SERPL-MCNC: 0.4 MG/DL (ref 0.3–1.2)
BUN SERPL-MCNC: 65 MG/DL (ref 7–22)
CALCIUM SERPL-MCNC: 8.5 MG/DL (ref 8.5–10.5)
CHLORIDE SERPL-SCNC: 114 MEQ/L (ref 98–111)
CO2 SERPL-SCNC: 18 MEQ/L (ref 23–33)
CREAT SERPL-MCNC: 4.6 MG/DL (ref 0.4–1.2)
EKG ATRIAL RATE: 34 BPM
EKG P AXIS: 54 DEGREES
EKG P-R INTERVAL: 162 MS
EKG Q-T INTERVAL: 504 MS
EKG QRS DURATION: 88 MS
EKG QTC CALCULATION (BAZETT): 378 MS
EKG R AXIS: 49 DEGREES
EKG T AXIS: 24 DEGREES
EKG VENTRICULAR RATE: 34 BPM
GFR SERPL CREATININE-BSD FRML MDRD: 13 ML/MIN/1.73M2
GLUCOSE SERPL-MCNC: 138 MG/DL (ref 70–108)
MAGNESIUM SERPL-MCNC: 1.8 MG/DL (ref 1.6–2.4)
PH BLDV: 7.23 [PH] (ref 7.31–7.41)
PHOSPHATE SERPL-MCNC: 4.5 MG/DL (ref 2.4–4.7)
POTASSIUM SERPL-SCNC: 5.2 MEQ/L (ref 3.5–5.2)
PROT SERPL-MCNC: 6.4 G/DL (ref 6.1–8)
SODIUM SERPL-SCNC: 144 MEQ/L (ref 135–145)

## 2024-07-07 PROCEDURE — 2580000003 HC RX 258: Performed by: INTERNAL MEDICINE

## 2024-07-07 PROCEDURE — 36415 COLL VENOUS BLD VENIPUNCTURE: CPT

## 2024-07-07 PROCEDURE — 99232 SBSQ HOSP IP/OBS MODERATE 35: CPT | Performed by: INTERNAL MEDICINE

## 2024-07-07 PROCEDURE — 82800 BLOOD PH: CPT

## 2024-07-07 PROCEDURE — 83735 ASSAY OF MAGNESIUM: CPT

## 2024-07-07 PROCEDURE — 80053 COMPREHEN METABOLIC PANEL: CPT

## 2024-07-07 PROCEDURE — 1200000000 HC SEMI PRIVATE

## 2024-07-07 PROCEDURE — 99232 SBSQ HOSP IP/OBS MODERATE 35: CPT

## 2024-07-07 PROCEDURE — 93005 ELECTROCARDIOGRAM TRACING: CPT | Performed by: NURSE PRACTITIONER

## 2024-07-07 PROCEDURE — 84100 ASSAY OF PHOSPHORUS: CPT

## 2024-07-07 PROCEDURE — 6360000002 HC RX W HCPCS: Performed by: INTERNAL MEDICINE

## 2024-07-07 PROCEDURE — 93010 ELECTROCARDIOGRAM REPORT: CPT | Performed by: INTERNAL MEDICINE

## 2024-07-07 RX ORDER — SODIUM CHLORIDE 450 MG/100ML
INJECTION, SOLUTION INTRAVENOUS CONTINUOUS
Status: DISCONTINUED | OUTPATIENT
Start: 2024-07-07 | End: 2024-07-08 | Stop reason: HOSPADM

## 2024-07-07 RX ADMIN — METHYLPREDNISOLONE SODIUM SUCCINATE 500 MG: 500 INJECTION INTRAMUSCULAR; INTRAVENOUS at 10:35

## 2024-07-07 RX ADMIN — SODIUM CHLORIDE: 4.5 INJECTION, SOLUTION INTRAVENOUS at 23:43

## 2024-07-07 RX ADMIN — SODIUM CHLORIDE: 4.5 INJECTION, SOLUTION INTRAVENOUS at 14:49

## 2024-07-07 RX ADMIN — SODIUM CHLORIDE: 9 INJECTION, SOLUTION INTRAVENOUS at 04:54

## 2024-07-07 NOTE — PROCEDURES
PROCEDURE NOTE  Date: 7/7/2024   Name: Servando Saha  YOB: 1955    Procedures  12 lead EKG completed. Results handed to Cecile STEVENS.

## 2024-07-07 NOTE — PROGRESS NOTES
[facility]   Nephrology progress note  7/7/2024, 11:47 AM      Pt Name:    Servando Saha  MRN:     [id number]     YOB: 1955  Admit Date:    7/3/2024  9:50 AM    Chief Complaint: Nephrology following for FREDERICK/CKD.    Subjective:  Patient seen and examined  No chest pain or shortness of breath  Feels okay eating and drinking well  Since having decent urine output    Objective:  24HR INTAKE/OUTPUT:  No intake or output data in the 24 hours ending 07/07/24 1147     Admission weight: 81.6 kg (180 lb)  Wt Readings from Last 3 Encounters:   07/06/24 83.1 kg (183 lb 4.8 oz)   02/22/24 78.5 kg (173 lb)   02/12/24 83 kg (183 lb)        Vitals :   Vitals:    07/06/24 2340 07/07/24 0310 07/07/24 0800 07/07/24 1130   BP: 118/61 127/65 137/61 (!) 117/56   Pulse: (!) 44 65 (!) 35 (!) 39   Resp: 16 18 16 16   Temp: 98.1 °F (36.7 °C) 97.9 °F (36.6 °C) 97.5 °F (36.4 °C) 97.7 °F (36.5 °C)   TempSrc: Oral Oral Oral Oral   SpO2: 98% 95% 99% 94%   Weight:       Height:           Physical examination  General Appearance:  Well developed. No distress  Mouth/Throat:  Oral mucosa moist  Neck:  Supple, no JVD  Lungs:  Breath sounds: clear  Heart::  S1,S2 heard  Abdomen:  Soft, non - tender  Musculoskeletal:  Edema -no edema noted    Medications:  Infusion:    sodium chloride 100 mL/hr at 07/07/24 0454    sodium chloride      dextrose       Meds:    sodium chloride flush  5-40 mL IntraVENous 2 times per day    heparin (porcine)  5,000 Units SubCUTAneous 3 times per day       Lab Data :  CBC:   Recent Labs     07/05/24  0553   WBC 7.7   HGB 10.2*   HCT 31.9*          CMP:  Recent Labs     07/05/24  0553 07/06/24  0804 07/07/24  0558    139 144   K 4.6 3.9 5.2    108 114*   CO2 20* 17* 18*   BUN 54* 55* 65*   CREATININE 5.9* 4.9* 4.6*   GLUCOSE 91 148* 138*   CALCIUM 8.3* 8.7 8.5   MG 1.6 1.5* 1.8   PHOS 4.9* 4.7 4.5       Hepatic:   Recent Labs     07/07/24  0558   *   *

## 2024-07-07 NOTE — PROGRESS NOTES
0510- This RN notified Bennie Garduno NP of patient's heart rate dropping to 28 and having occasional PVCs while asleep. Resource nurse, Cuong, came to the unit to check on patient and discuss the case. EKG done and labs ordered.

## 2024-07-07 NOTE — PLAN OF CARE
Problem: Discharge Planning  Goal: Discharge to home or other facility with appropriate resources  Outcome: Progressing  Flowsheets (Taken 7/7/2024 0216)  Discharge to home or other facility with appropriate resources:   Identify barriers to discharge with patient and caregiver   Arrange for needed discharge resources and transportation as appropriate   Identify discharge learning needs (meds, wound care, etc)     Problem: Pain  Goal: Verbalizes/displays adequate comfort level or baseline comfort level  Outcome: Progressing  Flowsheets (Taken 7/7/2024 0216)  Verbalizes/displays adequate comfort level or baseline comfort level:   Encourage patient to monitor pain and request assistance   Assess pain using appropriate pain scale   Administer analgesics based on type and severity of pain and evaluate response   Implement non-pharmacological measures as appropriate and evaluate response     Problem: Safety - Adult  Goal: Free from fall injury  Outcome: Progressing  Flowsheets (Taken 7/7/2024 0216)  Free From Fall Injury:   Instruct family/caregiver on patient safety   Based on caregiver fall risk screen, instruct family/caregiver to ask for assistance with transferring infant if caregiver noted to have fall risk factors     Problem: Cardiovascular - Adult  Goal: Maintains optimal cardiac output and hemodynamic stability  Outcome: Progressing  Flowsheets (Taken 7/7/2024 0216)  Maintains optimal cardiac output and hemodynamic stability:   Monitor blood pressure and heart rate   Assess for signs of decreased cardiac output     Problem: Skin/Tissue Integrity - Adult  Goal: Skin integrity remains intact  Outcome: Progressing  Flowsheets (Taken 7/7/2024 0216)  Skin Integrity Remains Intact: Monitor for areas of redness and/or skin breakdown     Problem: Metabolic/Fluid and Electrolytes - Adult  Goal: Electrolytes maintained within normal limits  Outcome: Progressing  Flowsheets (Taken 7/7/2024 0216)  Electrolytes maintained

## 2024-07-07 NOTE — PROGRESS NOTES
Hospitalist Progress Note    Patient:  Servando Saha      Unit/Bed:6K-22/022-A    YOB: 1955    MRN: [id number]       Acct: [id number]     PCP: Apurva Lozano APRN - CNP    Date of Admission: 7/3/2024    Assessment/Plan:    FREDERICK.  Likely due to AIN-improved.  Creatinine is trending down consistently.  Clinically patient is not volume overloaded, recorded good urine output, no peripheral pitting edema noted.  Labs reviewed.  Creatinine  improving...  -on high dose IV steroid therapy.   -Nephrology following.   -Avoiding nephrotoxic meds as able.   -Daily weights, strict WILLARD's.  Fall/aspiration precautions.  Daily BMP, magnesium/phosphorus. .  Mild transaminase elevation with hypoalbuminemia.  Noted on lab, however, mild elevation.  No obvious cause.  Patient is on high-dose IV steroid therapy for FREDERICK due to AIN.  Will continue monitoring.  Daily liver panel.  Hypomagnesemia.  Magnesium 1.5.  Due to poor oral intake and FREDERICK.  Replaced magnesium.  Daily magnesium.  Mild NAGMA.  Likely due to uremia.  Patient has decent urine output.  Nephrology following for severe FREDERICK due to ATN.  Mild normocytic anemia.  No signs of acute bleeding.  Monitoring.  Daily CBC.  Hyperkalemia.  Resolved  Primary hypertension.  For now blood pressure is controlled.  Hydralazine.  If systolic blood pressure over 170 mmHg.  DVT prophylaxis.  Heparin subcu.  Right-sided pelvic disease.  Noted on ultrasound.  Contributed to FREDERICK  Suspected left renal cyst.  Noted on ultrasound.  Contributing to FREDERICK.        Expected discharge date: TBD    Disposition:    [x] Home       [] TCU       [] Rehab       [] Psych       [] SNF       [] Long Term Care Facility       [] Other-    Chief Complaint: Abnormal labs, fatigue    Hospital Course: Initial HPI -- Servando Saha is a 69 y.o. male with a history of nephrolithiasis and hypercalciuria who presented to [facility] with chief complaint of fatigue. Over the past couple weeks, the patient

## 2024-07-08 ENCOUNTER — APPOINTMENT (OUTPATIENT)
Age: 69
End: 2024-07-08
Payer: MEDICARE

## 2024-07-08 ENCOUNTER — APPOINTMENT (OUTPATIENT)
Dept: GENERAL RADIOLOGY | Age: 69
End: 2024-07-08
Payer: MEDICARE

## 2024-07-08 VITALS
HEIGHT: 72 IN | BODY MASS INDEX: 24.83 KG/M2 | TEMPERATURE: 98.9 F | HEART RATE: 48 BPM | WEIGHT: 183.3 LBS | DIASTOLIC BLOOD PRESSURE: 74 MMHG | SYSTOLIC BLOOD PRESSURE: 131 MMHG | OXYGEN SATURATION: 98 % | RESPIRATION RATE: 16 BRPM

## 2024-07-08 DIAGNOSIS — N18.2 CKD (CHRONIC KIDNEY DISEASE), STAGE II: Primary | ICD-10-CM

## 2024-07-08 DIAGNOSIS — N20.0 NEPHROLITHIASIS: ICD-10-CM

## 2024-07-08 LAB
ALBUMIN SERPL BCG-MCNC: 3.3 G/DL (ref 3.5–5.1)
ALP SERPL-CCNC: 198 U/L (ref 38–126)
ALT SERPL W/O P-5'-P-CCNC: 111 U/L (ref 11–66)
ANION GAP SERPL CALC-SCNC: 13 MEQ/L (ref 8–16)
ANION GAP SERPL CALC-SCNC: 15 MEQ/L (ref 8–16)
AST SERPL-CCNC: 63 U/L (ref 5–40)
BASOPHILS ABSOLUTE: 0 THOU/MM3 (ref 0–0.1)
BASOPHILS NFR BLD AUTO: 0.1 %
BILIRUB CONJ SERPL-MCNC: 0.2 MG/DL (ref 0.1–13.8)
BILIRUB SERPL-MCNC: 0.4 MG/DL (ref 0.3–1.2)
BUN SERPL-MCNC: 63 MG/DL (ref 7–22)
BUN SERPL-MCNC: 64 MG/DL (ref 7–22)
CALCIUM SERPL-MCNC: 8.5 MG/DL (ref 8.5–10.5)
CALCIUM SERPL-MCNC: 8.7 MG/DL (ref 8.5–10.5)
CHLORIDE SERPL-SCNC: 112 MEQ/L (ref 98–111)
CHLORIDE SERPL-SCNC: 113 MEQ/L (ref 98–111)
CO2 SERPL-SCNC: 16 MEQ/L (ref 23–33)
CO2 SERPL-SCNC: 17 MEQ/L (ref 23–33)
CREAT SERPL-MCNC: 3.6 MG/DL (ref 0.4–1.2)
CREAT SERPL-MCNC: 3.7 MG/DL (ref 0.4–1.2)
DEPRECATED RDW RBC AUTO: 47.6 FL (ref 35–45)
ECHO AO ASC DIAM: 3.2 CM
ECHO AO ASCENDING AORTA INDEX: 1.56 CM/M2
ECHO AV CUSP MM: 2.4 CM
ECHO AV PEAK GRADIENT: 7 MMHG
ECHO AV PEAK VELOCITY: 1.3 M/S
ECHO AV VELOCITY RATIO: 0.85
ECHO BSA: 2.04 M2
ECHO EST RA PRESSURE: 5 MMHG
ECHO LA AREA 2C: 24.9 CM2
ECHO LA AREA 4C: 24.8 CM2
ECHO LA DIAMETER INDEX: 1.95 CM/M2
ECHO LA DIAMETER: 4 CM
ECHO LA MAJOR AXIS: 6.6 CM
ECHO LA MINOR AXIS: 6 CM
ECHO LA VOL BP: 82 ML (ref 18–58)
ECHO LA VOL MOD A2C: 87 ML (ref 18–58)
ECHO LA VOL MOD A4C: 73 ML (ref 18–58)
ECHO LA VOL/BSA BIPLANE: 40 ML/M2 (ref 16–34)
ECHO LA VOLUME INDEX MOD A2C: 42 ML/M2 (ref 16–34)
ECHO LA VOLUME INDEX MOD A4C: 36 ML/M2 (ref 16–34)
ECHO LV E' LATERAL VELOCITY: 7 CM/S
ECHO LV E' SEPTAL VELOCITY: 8 CM/S
ECHO LV FRACTIONAL SHORTENING: 35 % (ref 28–44)
ECHO LV INTERNAL DIMENSION DIASTOLE INDEX: 2.63 CM/M2
ECHO LV INTERNAL DIMENSION DIASTOLIC: 5.4 CM (ref 4.2–5.9)
ECHO LV INTERNAL DIMENSION SYSTOLIC INDEX: 1.71 CM/M2
ECHO LV INTERNAL DIMENSION SYSTOLIC: 3.5 CM
ECHO LV ISOVOLUMETRIC RELAXATION TIME (IVRT): 56 MS
ECHO LV IVSD: 0.9 CM (ref 0.6–1)
ECHO LV MASS 2D: 193.3 G (ref 88–224)
ECHO LV MASS INDEX 2D: 94.3 G/M2 (ref 49–115)
ECHO LV POSTERIOR WALL DIASTOLIC: 1 CM (ref 0.6–1)
ECHO LV RELATIVE WALL THICKNESS RATIO: 0.37
ECHO LVOT PEAK GRADIENT: 5 MMHG
ECHO LVOT PEAK VELOCITY: 1.1 M/S
ECHO MV A VELOCITY: 0.5 M/S
ECHO MV E DECELERATION TIME (DT): 310 MS
ECHO MV E VELOCITY: 0.93 M/S
ECHO MV E/A RATIO: 1.86
ECHO MV E/E' LATERAL: 13.29
ECHO MV E/E' RATIO (AVERAGED): 12.46
ECHO MV E/E' SEPTAL: 11.63
ECHO MV REGURGITANT PEAK GRADIENT: 96 MMHG
ECHO MV REGURGITANT PEAK VELOCITY: 4.9 M/S
ECHO PULMONARY ARTERY END DIASTOLIC PRESSURE: 5 MMHG
ECHO PV MAX VELOCITY: 0.8 M/S
ECHO PV PEAK GRADIENT: 2 MMHG
ECHO PV REGURGITANT MAX VELOCITY: 1.2 M/S
ECHO RV INTERNAL DIMENSION: 3.2 CM
ECHO RV TAPSE: 3.4 CM (ref 1.7–?)
ECHO TV E WAVE: 0.6 M/S
ECHO TV REGURGITANT MAX VELOCITY: 2.81 M/S
ECHO TV REGURGITANT MAX VELOCITY: 2.81 M/S
ECHO TV REGURGITANT PEAK GRADIENT: 32 MMHG
EOSINOPHIL NFR BLD AUTO: 0 %
EOSINOPHILS ABSOLUTE: 0 THOU/MM3 (ref 0–0.4)
ERYTHROCYTE [DISTWIDTH] IN BLOOD BY AUTOMATED COUNT: 13.3 % (ref 11.5–14.5)
GFR SERPL CREATININE-BSD FRML MDRD: 17 ML/MIN/1.73M2
GFR SERPL CREATININE-BSD FRML MDRD: 18 ML/MIN/1.73M2
GLUCOSE SERPL-MCNC: 126 MG/DL (ref 70–108)
GLUCOSE SERPL-MCNC: 127 MG/DL (ref 70–108)
HCT VFR BLD AUTO: 30.7 % (ref 42–52)
HGB BLD-MCNC: 9.8 GM/DL (ref 14–18)
IMM GRANULOCYTES # BLD AUTO: 0.1 THOU/MM3 (ref 0–0.07)
IMM GRANULOCYTES NFR BLD AUTO: 0.8 %
LYMPHOCYTES ABSOLUTE: 0.9 THOU/MM3 (ref 1–4.8)
LYMPHOCYTES NFR BLD AUTO: 6.8 %
MAGNESIUM SERPL-MCNC: 1.6 MG/DL (ref 1.6–2.4)
MCH RBC QN AUTO: 30.6 PG (ref 26–33)
MCHC RBC AUTO-ENTMCNC: 31.9 GM/DL (ref 32.2–35.5)
MCV RBC AUTO: 95.9 FL (ref 80–94)
MONOCYTES ABSOLUTE: 0.6 THOU/MM3 (ref 0.4–1.3)
MONOCYTES NFR BLD AUTO: 5 %
NEUTROPHILS ABSOLUTE: 11.1 THOU/MM3 (ref 1.8–7.7)
NEUTROPHILS NFR BLD AUTO: 87.3 %
NRBC BLD AUTO-RTO: 0 /100 WBC
PHOSPHATE SERPL-MCNC: 4.2 MG/DL (ref 2.4–4.7)
PLATELET # BLD AUTO: 358 THOU/MM3 (ref 130–400)
PMV BLD AUTO: 10.2 FL (ref 9.4–12.4)
POTASSIUM SERPL-SCNC: 4.5 MEQ/L (ref 3.5–5.2)
POTASSIUM SERPL-SCNC: 4.8 MEQ/L (ref 3.5–5.2)
PROT SERPL-MCNC: 6.4 G/DL (ref 6.1–8)
RBC # BLD AUTO: 3.2 MILL/MM3 (ref 4.7–6.1)
SODIUM SERPL-SCNC: 143 MEQ/L (ref 135–145)
SODIUM SERPL-SCNC: 143 MEQ/L (ref 135–145)
WBC # BLD AUTO: 12.7 THOU/MM3 (ref 4.8–10.8)

## 2024-07-08 PROCEDURE — 71045 X-RAY EXAM CHEST 1 VIEW: CPT

## 2024-07-08 PROCEDURE — 84100 ASSAY OF PHOSPHORUS: CPT

## 2024-07-08 PROCEDURE — 80053 COMPREHEN METABOLIC PANEL: CPT

## 2024-07-08 PROCEDURE — 99232 SBSQ HOSP IP/OBS MODERATE 35: CPT | Performed by: INTERNAL MEDICINE

## 2024-07-08 PROCEDURE — 99239 HOSP IP/OBS DSCHRG MGMT >30: CPT

## 2024-07-08 PROCEDURE — 93306 TTE W/DOPPLER COMPLETE: CPT

## 2024-07-08 PROCEDURE — 6370000000 HC RX 637 (ALT 250 FOR IP): Performed by: INTERNAL MEDICINE

## 2024-07-08 PROCEDURE — 85025 COMPLETE CBC W/AUTO DIFF WBC: CPT

## 2024-07-08 PROCEDURE — 93306 TTE W/DOPPLER COMPLETE: CPT | Performed by: INTERNAL MEDICINE

## 2024-07-08 PROCEDURE — 83735 ASSAY OF MAGNESIUM: CPT

## 2024-07-08 PROCEDURE — 82248 BILIRUBIN DIRECT: CPT

## 2024-07-08 PROCEDURE — 2580000003 HC RX 258: Performed by: INTERNAL MEDICINE

## 2024-07-08 PROCEDURE — 36415 COLL VENOUS BLD VENIPUNCTURE: CPT

## 2024-07-08 RX ORDER — PREDNISONE 2.5 MG/1
TABLET ORAL
Qty: 21 TABLET | Refills: 0 | Status: SHIPPED | OUTPATIENT
Start: 2024-07-31

## 2024-07-08 RX ORDER — PREDNISONE 50 MG/1
50 TABLET ORAL DAILY
Qty: 3 TABLET | Refills: 0 | Status: SHIPPED | OUTPATIENT
Start: 2024-07-19 | End: 2024-07-08 | Stop reason: HOSPADM

## 2024-07-08 RX ORDER — PREDNISONE 5 MG/1
5 TABLET ORAL DAILY
Qty: 3 TABLET | Refills: 0 | Status: SHIPPED | OUTPATIENT
Start: 2024-08-03 | End: 2024-07-08 | Stop reason: HOSPADM

## 2024-07-08 RX ORDER — PREDNISONE 10 MG/1
30 TABLET ORAL DAILY
Qty: 9 TABLET | Refills: 0 | Status: SHIPPED | OUTPATIENT
Start: 2024-07-25 | End: 2024-07-08 | Stop reason: HOSPADM

## 2024-07-08 RX ORDER — PREDNISONE 20 MG/1
60 TABLET ORAL DAILY
Qty: 30 TABLET | Refills: 0 | Status: SHIPPED | OUTPATIENT
Start: 2024-07-09 | End: 2024-07-08 | Stop reason: HOSPADM

## 2024-07-08 RX ORDER — PREDNISONE 10 MG/1
10 TABLET ORAL DAILY
Status: DISCONTINUED | OUTPATIENT
Start: 2024-07-31 | End: 2024-07-08 | Stop reason: HOSPADM

## 2024-07-08 RX ORDER — PREDNISONE 2.5 MG/1
2.5 TABLET ORAL DAILY
Qty: 3 TABLET | Refills: 0 | Status: SHIPPED | OUTPATIENT
Start: 2024-08-06 | End: 2024-07-08 | Stop reason: HOSPADM

## 2024-07-08 RX ORDER — PREDNISONE 5 MG/1
5 TABLET ORAL DAILY
Status: DISCONTINUED | OUTPATIENT
Start: 2024-08-03 | End: 2024-07-08 | Stop reason: HOSPADM

## 2024-07-08 RX ORDER — PREDNISONE 20 MG/1
20 TABLET ORAL DAILY
Status: DISCONTINUED | OUTPATIENT
Start: 2024-07-28 | End: 2024-07-08 | Stop reason: HOSPADM

## 2024-07-08 RX ORDER — SODIUM BICARBONATE 650 MG/1
1300 TABLET ORAL 2 TIMES DAILY
Qty: 120 TABLET | Refills: 0 | Status: SHIPPED | OUTPATIENT
Start: 2024-07-08 | End: 2024-08-07

## 2024-07-08 RX ORDER — PREDNISONE 50 MG/1
50 TABLET ORAL DAILY
Status: DISCONTINUED | OUTPATIENT
Start: 2024-07-19 | End: 2024-07-08 | Stop reason: HOSPADM

## 2024-07-08 RX ORDER — PREDNISONE 20 MG/1
TABLET ORAL
Qty: 51 TABLET | Refills: 0 | Status: SHIPPED | OUTPATIENT
Start: 2024-07-08

## 2024-07-08 RX ORDER — PREDNISONE 20 MG/1
20 TABLET ORAL DAILY
Qty: 3 TABLET | Refills: 0 | Status: SHIPPED | OUTPATIENT
Start: 2024-07-28 | End: 2024-07-08 | Stop reason: HOSPADM

## 2024-07-08 RX ORDER — PREDNISONE 5 MG/1
2.5 TABLET ORAL DAILY
Status: DISCONTINUED | OUTPATIENT
Start: 2024-08-06 | End: 2024-07-08 | Stop reason: HOSPADM

## 2024-07-08 RX ORDER — PREDNISONE 20 MG/1
40 TABLET ORAL DAILY
Status: DISCONTINUED | OUTPATIENT
Start: 2024-07-22 | End: 2024-07-08 | Stop reason: HOSPADM

## 2024-07-08 RX ORDER — PREDNISONE 10 MG/1
10 TABLET ORAL DAILY
Qty: 3 TABLET | Refills: 0 | Status: SHIPPED | OUTPATIENT
Start: 2024-07-31 | End: 2024-07-08 | Stop reason: HOSPADM

## 2024-07-08 RX ORDER — PREDNISONE 20 MG/1
40 TABLET ORAL DAILY
Qty: 6 TABLET | Refills: 0 | Status: SHIPPED | OUTPATIENT
Start: 2024-07-22 | End: 2024-07-08 | Stop reason: HOSPADM

## 2024-07-08 RX ORDER — SODIUM BICARBONATE 650 MG/1
1300 TABLET ORAL 2 TIMES DAILY
Status: DISCONTINUED | OUTPATIENT
Start: 2024-07-08 | End: 2024-07-08 | Stop reason: HOSPADM

## 2024-07-08 RX ADMIN — SODIUM CHLORIDE: 4.5 INJECTION, SOLUTION INTRAVENOUS at 07:58

## 2024-07-08 RX ADMIN — PREDNISONE 60 MG: 50 TABLET ORAL at 08:45

## 2024-07-08 RX ADMIN — SODIUM BICARBONATE 1300 MG: 650 TABLET ORAL at 12:23

## 2024-07-08 NOTE — PROGRESS NOTES
[facility]   Nephrology progress note  7/8/2024, 10:31 AM      Pt Name:    Servando Saha  MRN:     [id number]     YOB: 1955  Admit Date:    7/3/2024  9:50 AM    Chief Complaint: Nephrology following for FREDERICK/CKD.    Subjective:  Patient seen and examined  Doing okay.   Having some bradycardia at night went down to 25.  Asymptomatic. Echo ordered.     Objective:  24HR INTAKE/OUTPUT:    Intake/Output Summary (Last 24 hours) at 7/8/2024 1031  Last data filed at 7/7/2024 1548  Gross per 24 hour   Intake 400 ml   Output --   Net 400 ml      Admission weight: 81.6 kg (180 lb)  Wt Readings from Last 3 Encounters:   07/06/24 83.1 kg (183 lb 4.8 oz)   02/22/24 78.5 kg (173 lb)   02/12/24 83 kg (183 lb)        Vitals :   Vitals:    07/07/24 2000 07/07/24 2341 07/08/24 0352 07/08/24 0745   BP: 114/62 138/66 (!) 149/77 (!) 152/85   Pulse: (!) 48 (!) 35 (!) 36 (!) 39   Resp: 18 18 18 18   Temp: 97.6 °F (36.4 °C) 97.7 °F (36.5 °C) 97.7 °F (36.5 °C) 97.7 °F (36.5 °C)   TempSrc: Oral Oral Oral Oral   SpO2: 96% 93% 95% 96%   Weight:       Height:           Physical examination  General Appearance:  Well developed. No distress  Mouth/Throat:  Oral mucosa moist  Neck:  Supple, no JVD  Lungs:  Breath sounds: clear  Heart::  S1,S2 heard  Abdomen:  Soft, non - tender  Musculoskeletal:  Edema -no edema noted    Medications:  Infusion:    sodium chloride 100 mL/hr at 07/08/24 0758    sodium chloride      dextrose       Meds:    sodium bicarbonate  1,300 mg Oral BID    [START ON 7/9/2024] predniSONE  60 mg Oral Daily    Followed by    [START ON 7/19/2024] predniSONE  50 mg Oral Daily    Followed by    [START ON 7/22/2024] predniSONE  40 mg Oral Daily    Followed by    [START ON 7/25/2024] predniSONE  30 mg Oral Daily    [START ON 7/28/2024] predniSONE  20 mg Oral Daily    Followed by    [START ON 7/31/2024] predniSONE  10 mg Oral Daily    Followed by    [START ON 8/3/2024] predniSONE  5 mg Oral Daily     Followed by    [START ON 8/6/2024] predniSONE  2.5 mg Oral Daily    sodium chloride flush  5-40 mL IntraVENous 2 times per day    heparin (porcine)  5,000 Units SubCUTAneous 3 times per day       Lab Data :  CBC:   Recent Labs     07/08/24  0720   WBC 12.7*   HGB 9.8*   HCT 30.7*        CMP:  Recent Labs     07/06/24  0804 07/07/24  0558 07/08/24  0720    144 143  143   K 3.9 5.2 4.5  4.8    114* 112*  113*   CO2 17* 18* 16*  17*   BUN 55* 65* 64*  63*   CREATININE 4.9* 4.6* 3.7*  3.6*   GLUCOSE 148* 138* 127*  126*   CALCIUM 8.7 8.5 8.5  8.7   MG 1.5* 1.8 1.6   PHOS 4.7 4.5 4.2     Hepatic:   Recent Labs     07/07/24  0558 07/08/24  0720   * 63*   * 111*   BILITOT 0.4 0.4   ALKPHOS 215* 198*       Assessment and Plan:  FREDERICK: likely due to acute interstitial nephritis from PPI.   Sterile pyuria noted and + urine eosinophils  Improving. S/p 3 doses of Solumedrol will transition to oral prednisone continue 60 mg daily for a total of 2 weeks then will reduce by 10 mg every 3 days.  Order placed for the prednisone taper  Ok for DC from renal standpoint needs to see Dr. Joshua in 1 week with BMP prior  Will continue to follow while hosiptalized    Hyperkalemia . Improved   Metabolic acidosis, add po bicarb  Essential hypertension  History of kidney stones  6. Bradycardia.  Echo ordered.     Mariann Kay,   [facility]    This report has been created using voice recognition software. It may contain minor errors which are inherent in voice recognition technology

## 2024-07-08 NOTE — PLAN OF CARE
Problem: Discharge Planning  Goal: Discharge to home or other facility with appropriate resources  Outcome: Progressing  Flowsheets  Taken 7/7/2024 2108 by Jessy Amos RN  Discharge to home or other facility with appropriate resources:   Identify barriers to discharge with patient and caregiver   Arrange for needed discharge resources and transportation as appropriate   Identify discharge learning needs (meds, wound care, etc)   Arrange for interpreters to assist at discharge as needed   Refer to discharge planning if patient needs post-hospital services based on physician order or complex needs related to functional status, cognitive ability or social support system    Problem: Pain  Goal: Verbalizes/displays adequate comfort level or baseline comfort level  Outcome: Progressing  Flowsheets  Taken 7/7/2024 2108 by Jessy Amos RN  Verbalizes/displays adequate comfort level or baseline comfort level:   Encourage patient to monitor pain and request assistance   Assess pain using appropriate pain scale   Administer analgesics based on type and severity of pain and evaluate response   Implement non-pharmacological measures as appropriate and evaluate response   Consider cultural and social influences on pain and pain management   Notify Licensed Independent Practitioner if interventions unsuccessful or patient reports new pain    Problem: Safety - Adult  Goal: Free from fall injury  Outcome: Progressing  Flowsheets (Taken 7/7/2024 2108)  Free From Fall Injury:   Instruct family/caregiver on patient safety   Based on caregiver fall risk screen, instruct family/caregiver to ask for assistance with transferring infant if caregiver noted to have fall risk factors     Problem: Cardiovascular - Adult  Goal: Maintains optimal cardiac output and hemodynamic stability  Outcome: Progressing  Flowsheets  Taken 7/7/2024 2108 by Jessy Amos RN  Maintains optimal cardiac output and hemodynamic stability:   Monitor blood

## 2024-07-08 NOTE — PLAN OF CARE
Problem: Discharge Planning  Goal: Discharge to home or other facility with appropriate resources  Outcome: Adequate for Discharge     Problem: Pain  Goal: Verbalizes/displays adequate comfort level or baseline comfort level  Outcome: Adequate for Discharge     Problem: Safety - Adult  Goal: Free from fall injury  Outcome: Adequate for Discharge     Problem: Cardiovascular - Adult  Goal: Maintains optimal cardiac output and hemodynamic stability  Outcome: Adequate for Discharge     Problem: Skin/Tissue Integrity - Adult  Goal: Skin integrity remains intact  Outcome: Adequate for Discharge     Problem: Metabolic/Fluid and Electrolytes - Adult  Goal: Electrolytes maintained within normal limits  Outcome: Adequate for Discharge  Goal: Hemodynamic stability and optimal renal function maintained  Outcome: Adequate for Discharge   Care plan reviewed with patient.  Patient verbalizes understanding of the plan of care and contributes to goal setting.

## 2024-07-08 NOTE — DISCHARGE SUMMARY
Hospital Medicine Discharge Summary      Patient Identification:   Servando Saha   : 1955  MRN: [id number]   Account: [id number]      Patient's PCP: Apurva Lozano APRN - CNP    Admit Date: 7/3/2024     Discharge Date: 2024      Admitting Physician: Saad Smith PA-C     Discharge Physician: Carlos Jain DO     Discharge Diagnoses:  FREDERICK  AIN  Hypomagnesemia-resolved  Mild NAGMA  Mild normocytic anemia  Hyperkalemia-resolved  Primary hypertension.  Mild transaminase elevation  Hypoalbuminemia  Right side pelviectasis  Left kidney cyst  Nephrolithiasis    The patient was seen and examined on day of discharge and this discharge summary is in conjunction with any daily progress note from day of discharge.    Hospital Course:   Servando Saha is a 69 y.o. male admitted to [facility] on 7/3/2024 for abnormal labs and fatigue..      Initial HPI -- Servando Saha is a 69 y.o. male with a history of nephrolithiasis and hypercalciuria who presented to [facility] with chief complaint of fatigue. Over the past couple weeks, the patient has been having a multitude of symptoms. He reports he has been going about his day-to-day activities which typically include doing some part-time work and then some tasks around the house as well as at his in-laws. He reports in the mornings he would typically feel fine, but for about a 7 to 10-day. Starting about 2 weeks ago, the patient would have muscle aches, malaise, and chills in the evening. He would take an Aleve and typically feel better after this. Eventually, the symptoms subsided, but then the patient began experiencing diarrhea. This lasted for about 4 days and resolved a couple days ago. The patient reports about 4-6 stools per day, but did not have a significant amount of diarrhea. He has not had any vomiting. During this time, the patient denies any associated fevers, cough, shortness of breath, sore throat, or any rashes. He tested  negative for COVID-19. He has been drinking plenty of fluids, but did have a decreased appetite. He went to his PCP where a urine was checked and was found to be mildly abnormal and the patient was started on antibiotics. He denies any dysuria, frequency, or urgency. He reports he is still urinating and that it seems as though his urine output actually might be increased from baseline. He reports his urine is a pale yellow color and has not appeared concentrated. His PCP johanny routine lab work and the patient was found to have an acute kidney injury. He was sent to the emergency department for further evaluation. The patient has a history of nephrolithiasis, but denies any other history of kidney disease. He denies any flank pain or abdominal pain at this time. As mentioned previously, the patient does endorse some NSAID use, but often never took more than 1-2 Aleve per day. Other than the Omnicef which was recently started for the abnormal urinalysis, the patient denies any other recent antibiotics. The only other new medication that the patient started was omeprazole which was started in April for a diagnosis of Holm's esophagus. Patient denies any family history of renal disease or autoimmune disease. The patient does not currently smoke, drink alcohol, or use recreational drugs. He is very active at baseline.      7/5/2024 patient seen at bedside.  He is alert, awake X.3.  Patient complains some of fatigue but denies other symptoms including chest pain heart palpitations productive cough dizziness lightheadedness fever chills dizziness lightheadedness seizure abdominal pain dysuria diarrhea.  Nephrology following, started high-dose steroid therapy for possible ATN.  Continue current management.     7/6/2024..  Patient seen at the bedside this morning.  He is awake, alert oriented X.3.  Patient has no complaints except some fatigue.  Reports good urine output.  Clinically patient is not volume overloaded.

## 2024-07-08 NOTE — PROGRESS NOTES
AVS reviewed with patient and wife. Patient educated on new prescriptions and follow up appointments.  Patient sent to OP pharmacy to  new prescriptions. Patient discharged home with wife.

## 2024-07-08 NOTE — CARE COORDINATION
7/8/24, 1:46 PM EDT    Patient goals/plan/ treatment preferences discussed by  and .  Patient goals/plan/ treatment preferences reviewed with patient/ family.  Patient/ family verbalize understanding of discharge plan and are in agreement with goal/plan/treatment preferences.  Understanding was demonstrated using the teach back method.  AVS provided by RN at time of discharge, which includes all necessary medical information pertaining to the patients current course of illness, treatment, post-discharge goals of care, and treatment preferences.     Services At/After Discharge: None       Per Dr. Jain, plan for discharge this evening. Spoke w/ Servando. He is agreeable to discharge today. Plans to return home w/ wife. Denies needs.

## 2024-07-12 LAB
ANION GAP SERPL CALCULATED.3IONS-SCNC: 10 MEQ/L (ref 7–16)
BUN BLDV-MCNC: 48 MG/DL (ref 8–23)
CALCIUM SERPL-MCNC: 8.5 MG/DL (ref 8.5–10.5)
CHLORIDE BLD-SCNC: 105 MEQ/L (ref 95–107)
CO2: 26 MEQ/L (ref 19–31)
CREAT SERPL-MCNC: 2.08 MG/DL (ref 0.8–1.4)
EGFR IF NONAFRICAN AMERICAN: 34 ML/MIN/1.73
GLUCOSE: 96 MG/DL (ref 70–99)
POTASSIUM SERPL-SCNC: 4 MEQ/L (ref 3.5–5.4)
SODIUM BLD-SCNC: 141 MEQ/L (ref 133–146)

## 2024-07-15 ENCOUNTER — OFFICE VISIT (OUTPATIENT)
Dept: NEPHROLOGY | Age: 69
End: 2024-07-15
Payer: MEDICARE

## 2024-07-15 VITALS
BODY MASS INDEX: 23.91 KG/M2 | DIASTOLIC BLOOD PRESSURE: 86 MMHG | HEIGHT: 72 IN | WEIGHT: 176.5 LBS | OXYGEN SATURATION: 99 % | HEART RATE: 65 BPM | SYSTOLIC BLOOD PRESSURE: 155 MMHG

## 2024-07-15 DIAGNOSIS — N18.32 STAGE 3B CHRONIC KIDNEY DISEASE (HCC): ICD-10-CM

## 2024-07-15 DIAGNOSIS — N17.9 AKI (ACUTE KIDNEY INJURY) (HCC): Primary | ICD-10-CM

## 2024-07-15 DIAGNOSIS — N20.0 NEPHROLITHIASIS: ICD-10-CM

## 2024-07-15 PROCEDURE — 3077F SYST BP >= 140 MM HG: CPT | Performed by: INTERNAL MEDICINE

## 2024-07-15 PROCEDURE — 99214 OFFICE O/P EST MOD 30 MIN: CPT | Performed by: INTERNAL MEDICINE

## 2024-07-15 PROCEDURE — 3079F DIAST BP 80-89 MM HG: CPT | Performed by: INTERNAL MEDICINE

## 2024-07-15 PROCEDURE — 1123F ACP DISCUSS/DSCN MKR DOCD: CPT | Performed by: INTERNAL MEDICINE

## 2024-07-15 NOTE — PROGRESS NOTES
Renal Progress Note    Assessment and Plan:      Diagnosis Orders   1. FREDERICK (acute kidney injury) ([facility])  Basic Metabolic Panel      2. Stage 3b chronic kidney disease (HCC)        3. Nephrolithiasis                  PLAN:  I discussed my thoughts with the patient.  He understood well  Addressed his questions  Hospital records comprehensively and extensively reviewed.  Labs reviewed  Serum creatinine is improved to 2.08 mg/dL from a peak of 7.28 mg/dL  His baseline is around 1.3 mg/dL  Medications reviewed  No changes  Return visit in 4 weeks with labs            Patient Active Problem List   Diagnosis    Nephrolithiasis    Urolithiasis    Hypercalciuria    Arthritis of left hip    FREDERICK (acute kidney injury) (HCC)    Hyperkalemia    Metabolic acidosis    Primary hypertension    Renal cyst    Hypomagnesemia    Elevation of levels of liver transaminase levels    Hypoalbuminemia           Subjective:   Chief complaint:  Chief Complaint   Patient presents with    Follow-up     Hospital FU. Per Dr. Kay, 1 week FU      HPI:This is a follow up visit for Mr. Servando Saha here today for a for return appointment.  I see him mostly for nephrolithiasis..  Recently was hospitalized at [facility] for acute kidney injury.  Serum creatinine peaked to 7.28 mg/dL.  At time of discharge was down to 2.08 mg/dL.  The last serum creatinine before this hospital course was in February 2024 and it was 1.3 mg/dl at that time.  His baseline has been between 0.9 mg/dL up to 1.3 mg/dL.  Etiology of the acute kidney injury was thought to be due to volume contraction.  He was also on a proton pump inhibitor which was thought to be a contributory factor.  His urine eosinophils was  positive suggestive of possible interstitial nephritis.  He was treated with steroids.  Doing well since discharge with no complaint today.  Appetite is good.  Urinates well.  Denies chest pain or shortness of breath.      .            ROS:  Pertinent

## 2024-08-13 LAB
ANION GAP SERPL CALCULATED.3IONS-SCNC: 8 MEQ/L (ref 7–16)
BUN BLDV-MCNC: 19 MG/DL (ref 8–23)
CALCIUM SERPL-MCNC: 9.2 MG/DL (ref 8.5–10.5)
CHLORIDE BLD-SCNC: 101 MEQ/L (ref 95–107)
CO2: 28 MEQ/L (ref 19–31)
CREAT SERPL-MCNC: 1.45 MG/DL (ref 0.8–1.4)
EGFR IF NONAFRICAN AMERICAN: 53 ML/MIN/1.73
GLUCOSE: 138 MG/DL (ref 70–99)
POTASSIUM SERPL-SCNC: 4.9 MEQ/L (ref 3.5–5.4)
SODIUM BLD-SCNC: 137 MEQ/L (ref 133–146)

## 2024-08-19 ENCOUNTER — OFFICE VISIT (OUTPATIENT)
Dept: NEPHROLOGY | Age: 69
End: 2024-08-19
Payer: MEDICARE

## 2024-08-19 VITALS
HEART RATE: 71 BPM | WEIGHT: 177 LBS | HEIGHT: 72 IN | BODY MASS INDEX: 23.98 KG/M2 | SYSTOLIC BLOOD PRESSURE: 122 MMHG | OXYGEN SATURATION: 100 % | DIASTOLIC BLOOD PRESSURE: 81 MMHG

## 2024-08-19 DIAGNOSIS — N18.32 STAGE 3B CHRONIC KIDNEY DISEASE (HCC): ICD-10-CM

## 2024-08-19 DIAGNOSIS — N20.0 NEPHROLITHIASIS: ICD-10-CM

## 2024-08-19 DIAGNOSIS — N17.9 AKI (ACUTE KIDNEY INJURY) (HCC): Primary | ICD-10-CM

## 2024-08-19 DIAGNOSIS — E83.42 HYPOMAGNESEMIA: ICD-10-CM

## 2024-08-19 PROCEDURE — 1123F ACP DISCUSS/DSCN MKR DOCD: CPT | Performed by: INTERNAL MEDICINE

## 2024-08-19 PROCEDURE — 99214 OFFICE O/P EST MOD 30 MIN: CPT | Performed by: INTERNAL MEDICINE

## 2024-08-19 PROCEDURE — 3079F DIAST BP 80-89 MM HG: CPT | Performed by: INTERNAL MEDICINE

## 2024-08-19 PROCEDURE — 3074F SYST BP LT 130 MM HG: CPT | Performed by: INTERNAL MEDICINE

## 2024-08-19 RX ORDER — MAGNESIUM OXIDE 400 MG/1
400 TABLET ORAL DAILY
Qty: 90 TABLET | Refills: 3 | Status: SHIPPED | OUTPATIENT
Start: 2024-08-19

## 2024-08-19 RX ORDER — MULTIVIT-MIN/IRON/FOLIC ACID/K 18-600-40
CAPSULE ORAL
COMMUNITY

## 2024-08-19 NOTE — PROGRESS NOTES
Renal Progress Note    Assessment and Plan:      Diagnosis Orders   1. FREDERICK (acute kidney injury) (HCC)        2. Stage 3b chronic kidney disease (HCC)  Basic Metabolic Panel      3. Nephrolithiasis        4. Hypomagnesemia  Magnesium    Magnesium                PLAN:  Acute kidney injury resolved with a serum creatinine coming back to his baseline.  Chronic kidney disease remained stable.  Nephrolithiasis stable with no recent stone attack.  Etiology of hypomagnesemia is not clear but may be due to recent diarrhea.  Magnesium oxide 400 mg once a day.  Repeat magnesium level in about a week.  Medications reviewed  No other changes  Hospital records reviewed  Return visit in 6 months with labs          Patient Active Problem List   Diagnosis    Nephrolithiasis    Urolithiasis    Hypercalciuria    Arthritis of left hip    FREDERICK (acute kidney injury) (HCC)    Hyperkalemia    Metabolic acidosis    Primary hypertension    Renal cyst    Hypomagnesemia    Elevation of levels of liver transaminase levels    Hypoalbuminemia           Subjective:   Chief complaint:  Chief Complaint   Patient presents with    Follow-up     FREDERICK      HPI:This is a follow up visit for Mr. Servando Saha ere today for return appointment.  I see him for chronic kidney disease and nephrolithiasis.  He was last seen in July 2024.  At that time serum creatinine was 2.08 mg/dL which is different from his baseline of 1.3 mg/dL.  However, it had worsened significantly in between going up to 6.9 mg/dL in the hospital.  He is doing well with no complaint.  Appetite is good.  Denies chest pain or shortness of breath and no difficulties with urination.  He recently was hospitalized at [facility] due to acute kidney injury thought to be due to combination of diarrhea and ibuprofen.  He was treated and discharged in good condition.  He also has a history of nephrolithiasis.    ROS:  Pertinent positives stated above in HPI. All other systems were reviewed and were

## 2024-08-29 ENCOUNTER — TELEPHONE (OUTPATIENT)
Dept: NEPHROLOGY | Age: 69
End: 2024-08-29

## 2024-08-29 LAB — MAGNESIUM: 2.1 MG/DL (ref 1.6–2.6)

## 2024-08-29 NOTE — TELEPHONE ENCOUNTER
Left a message on patients voicemail informing him his magnesium level is normal and asked that he call back to confirm receipt of message.

## 2024-08-29 NOTE — TELEPHONE ENCOUNTER
----- Message from Dr. Woody Joshua MD sent at 8/29/2024 11:37 AM EDT -----  Serum magnesium is normal.

## 2025-02-01 LAB
BUN BLDV-MCNC: 25 MG/DL (ref 8–23)
CALCIUM SERPL-MCNC: 9.6 MG/DL (ref 8.6–10.5)
CHLORIDE BLD-SCNC: 101 MMOL/L (ref 96–107)
CO2: 26 MMOL/L (ref 18–32)
CREAT SERPL-MCNC: 1.43 MG/DL (ref 0.67–1.3)
EGFR IF NONAFRICAN AMERICAN: 54 ML/MIN/1.73M2
GLUCOSE: 86 MG/DL (ref 70–100)
MAGNESIUM: 2.3 MG/DL (ref 1.6–2.6)
POTASSIUM SERPL-SCNC: 5 MMOL/L (ref 3.5–5.4)
SODIUM BLD-SCNC: 140 MMOL/L (ref 135–148)

## 2025-02-10 ENCOUNTER — OFFICE VISIT (OUTPATIENT)
Dept: NEPHROLOGY | Age: 70
End: 2025-02-10

## 2025-02-10 VITALS
BODY MASS INDEX: 24.92 KG/M2 | SYSTOLIC BLOOD PRESSURE: 119 MMHG | RESPIRATION RATE: 100 BRPM | HEIGHT: 72 IN | DIASTOLIC BLOOD PRESSURE: 80 MMHG | WEIGHT: 184 LBS | HEART RATE: 53 BPM

## 2025-02-10 DIAGNOSIS — N18.31 STAGE 3A CHRONIC KIDNEY DISEASE (HCC): Primary | ICD-10-CM

## 2025-02-10 DIAGNOSIS — E55.9 VITAMIN D DEFICIENCY: ICD-10-CM

## 2025-02-10 DIAGNOSIS — E83.42 HYPOMAGNESEMIA: ICD-10-CM

## 2025-02-10 PROBLEM — N18.32 STAGE 3B CHRONIC KIDNEY DISEASE (HCC): Status: ACTIVE | Noted: 2025-02-10

## 2025-02-10 NOTE — PROGRESS NOTES
BILIRUBINUR NEGATIVE 07/03/2024 03:20 PM    BLOODU NEGATIVE 07/03/2024 03:20 PM    GLUCOSEU NEGATIVE 07/03/2024 03:20 PM    KETUA NEGATIVE 07/03/2024 03:20 PM    AMORPHOUS NONE SEEN 09/11/2012 11:50 PM      Microalbumen/Creatinine ratio:  No components found for: \"RUCREAT\"    Objective:   Vitals:   Vitals:    02/10/25 0804   BP: 119/80   Pulse: 53   Resp: (!) 100           Constitutional:  Alert, awake, no apparent distress  Skin:normal with no rash or any significant lesions  HEENT:Pupils are reactive .Throat is clear.  Oral mucosa is moist.  Neck:supple with no thyromegaly, JVD, lymphadenopathy or bruit **  Cardiovascular: Regular sinus rhythm without murmur, rubs or gallops   Respiratory:  Clear to auscultation with no wheezes or rales  Abdomen: Good bowel sound, soft, non tender and no bruit  Ext: No LE edema  Musculoskeletal:Intact  Neuro:Alert, awake and oriented with no obvious focal deficit.  Speech is normal.**    Electronically signed by Woody Joshua MD on 2/10/2025 at 8:03 AM   **This report has been created using voice recognition software. It maycontain minor  errors which are inherent in voice recognition technology.**

## 2025-02-25 PROBLEM — E87.20 METABOLIC ACIDOSIS: Status: RESOLVED | Noted: 2024-07-04 | Resolved: 2025-02-25

## 2025-02-25 PROBLEM — N17.9 AKI (ACUTE KIDNEY INJURY): Status: RESOLVED | Noted: 2024-07-03 | Resolved: 2025-02-25

## 2025-04-28 DIAGNOSIS — R82.994 HYPERCALCIURIA: ICD-10-CM

## 2025-04-28 DIAGNOSIS — N20.0 NEPHROLITHIASIS: ICD-10-CM

## 2025-04-29 RX ORDER — POTASSIUM CITRATE 1080 MG/1
10 TABLET, EXTENDED RELEASE ORAL 2 TIMES DAILY
Qty: 180 TABLET | Refills: 3 | Status: SHIPPED | OUTPATIENT
Start: 2025-04-29